# Patient Record
Sex: MALE | Race: WHITE | NOT HISPANIC OR LATINO | Employment: OTHER | URBAN - METROPOLITAN AREA
[De-identification: names, ages, dates, MRNs, and addresses within clinical notes are randomized per-mention and may not be internally consistent; named-entity substitution may affect disease eponyms.]

---

## 2017-08-05 ENCOUNTER — APPOINTMENT (EMERGENCY)
Dept: RADIOLOGY | Facility: HOSPITAL | Age: 37
End: 2017-08-05

## 2017-08-05 ENCOUNTER — HOSPITAL ENCOUNTER (EMERGENCY)
Facility: HOSPITAL | Age: 37
Discharge: HOME | End: 2017-08-05
Attending: EMERGENCY MEDICINE | Admitting: EMERGENCY MEDICINE

## 2017-08-05 VITALS
TEMPERATURE: 97.4 F | RESPIRATION RATE: 18 BRPM | SYSTOLIC BLOOD PRESSURE: 124 MMHG | DIASTOLIC BLOOD PRESSURE: 84 MMHG | OXYGEN SATURATION: 98 % | HEART RATE: 54 BPM | WEIGHT: 209.88 LBS

## 2017-08-05 DIAGNOSIS — R07.9 CHEST PAIN, UNSPECIFIED TYPE: Primary | ICD-10-CM

## 2017-08-05 LAB
ANION GAP SERPL CALCULATED.3IONS-SCNC: 9 MMOL/L (ref 4–13)
BASOPHILS # BLD AUTO: 0.01 THOUSANDS/ΜL (ref 0–0.1)
BASOPHILS NFR BLD AUTO: 0 % (ref 0–1)
BUN SERPL-MCNC: 15 MG/DL (ref 5–25)
CALCIUM SERPL-MCNC: 8.8 MG/DL (ref 8.3–10.1)
CHLORIDE SERPL-SCNC: 99 MMOL/L (ref 100–108)
CO2 SERPL-SCNC: 29 MMOL/L (ref 21–32)
CREAT SERPL-MCNC: 0.85 MG/DL (ref 0.6–1.3)
EOSINOPHIL # BLD AUTO: 0.16 THOUSAND/ΜL (ref 0–0.61)
EOSINOPHIL NFR BLD AUTO: 3 % (ref 0–6)
ERYTHROCYTE [DISTWIDTH] IN BLOOD BY AUTOMATED COUNT: 12.6 % (ref 11.6–15.1)
GFR SERPL CREATININE-BSD FRML MDRD: 113 ML/MIN/1.73SQ M
GLUCOSE SERPL-MCNC: 103 MG/DL (ref 65–140)
HCT VFR BLD AUTO: 41.8 % (ref 36.5–49.3)
HGB BLD-MCNC: 14.4 G/DL (ref 12–17)
LIPASE SERPL-CCNC: 118 U/L (ref 73–393)
LYMPHOCYTES # BLD AUTO: 1.28 THOUSANDS/ΜL (ref 0.6–4.47)
LYMPHOCYTES NFR BLD AUTO: 25 % (ref 14–44)
MCH RBC QN AUTO: 30.8 PG (ref 26.8–34.3)
MCHC RBC AUTO-ENTMCNC: 34.4 G/DL (ref 31.4–37.4)
MCV RBC AUTO: 89 FL (ref 82–98)
MONOCYTES # BLD AUTO: 0.52 THOUSAND/ΜL (ref 0.17–1.22)
MONOCYTES NFR BLD AUTO: 10 % (ref 4–12)
NEUTROPHILS # BLD AUTO: 3.24 THOUSANDS/ΜL (ref 1.85–7.62)
NEUTS SEG NFR BLD AUTO: 62 % (ref 43–75)
PLATELET # BLD AUTO: 296 THOUSANDS/UL (ref 149–390)
PMV BLD AUTO: 8.8 FL (ref 8.9–12.7)
POTASSIUM SERPL-SCNC: 3.7 MMOL/L (ref 3.5–5.3)
RBC # BLD AUTO: 4.68 MILLION/UL (ref 3.88–5.62)
SODIUM SERPL-SCNC: 137 MMOL/L (ref 136–145)
TROPONIN I SERPL-MCNC: <0.02 NG/ML
WBC # BLD AUTO: 5.21 THOUSAND/UL (ref 4.31–10.16)

## 2017-08-05 PROCEDURE — 83690 ASSAY OF LIPASE: CPT | Performed by: PHYSICIAN ASSISTANT

## 2017-08-05 PROCEDURE — 84484 ASSAY OF TROPONIN QUANT: CPT | Performed by: PHYSICIAN ASSISTANT

## 2017-08-05 PROCEDURE — 71020 HB CHEST X-RAY 2VW FRONTAL&LATL: CPT

## 2017-08-05 PROCEDURE — 96360 HYDRATION IV INFUSION INIT: CPT

## 2017-08-05 PROCEDURE — 80048 BASIC METABOLIC PNL TOTAL CA: CPT | Performed by: PHYSICIAN ASSISTANT

## 2017-08-05 PROCEDURE — 36415 COLL VENOUS BLD VENIPUNCTURE: CPT | Performed by: PHYSICIAN ASSISTANT

## 2017-08-05 PROCEDURE — 93005 ELECTROCARDIOGRAM TRACING: CPT

## 2017-08-05 PROCEDURE — 93005 ELECTROCARDIOGRAM TRACING: CPT | Performed by: PHYSICIAN ASSISTANT

## 2017-08-05 PROCEDURE — 85025 COMPLETE CBC W/AUTO DIFF WBC: CPT | Performed by: PHYSICIAN ASSISTANT

## 2017-08-05 PROCEDURE — 99285 EMERGENCY DEPT VISIT HI MDM: CPT

## 2017-08-05 RX ADMIN — SODIUM CHLORIDE 500 ML: 0.9 INJECTION, SOLUTION INTRAVENOUS at 09:16

## 2017-08-07 LAB
ATRIAL RATE: 60 BPM
P AXIS: 37 DEGREES
PR INTERVAL: 172 MS
QRS AXIS: 44 DEGREES
QRSD INTERVAL: 102 MS
QT INTERVAL: 408 MS
QTC INTERVAL: 408 MS
T WAVE AXIS: 0 DEGREES
VENTRICULAR RATE: 60 BPM

## 2022-04-07 ENCOUNTER — APPOINTMENT (EMERGENCY)
Dept: CT IMAGING | Facility: HOSPITAL | Age: 42
End: 2022-04-07
Payer: COMMERCIAL

## 2022-04-07 ENCOUNTER — HOSPITAL ENCOUNTER (EMERGENCY)
Facility: HOSPITAL | Age: 42
Discharge: HOME | End: 2022-04-07
Attending: EMERGENCY MEDICINE | Admitting: EMERGENCY MEDICINE
Payer: COMMERCIAL

## 2022-04-07 VITALS
OXYGEN SATURATION: 95 % | TEMPERATURE: 98.6 F | RESPIRATION RATE: 18 BRPM | DIASTOLIC BLOOD PRESSURE: 82 MMHG | SYSTOLIC BLOOD PRESSURE: 129 MMHG | HEART RATE: 80 BPM

## 2022-04-07 DIAGNOSIS — K52.9 ENTERITIS: Primary | ICD-10-CM

## 2022-04-07 LAB
ALBUMIN SERPL BCP-MCNC: 4.6 G/DL (ref 3.5–5)
ALP SERPL-CCNC: 60 U/L (ref 46–116)
ALT SERPL W P-5'-P-CCNC: 44 U/L (ref 12–78)
ANION GAP SERPL CALCULATED.3IONS-SCNC: 11 MMOL/L (ref 4–13)
AST SERPL W P-5'-P-CCNC: 52 U/L (ref 5–45)
BASOPHILS # BLD AUTO: 0.02 THOUSANDS/ΜL (ref 0–0.1)
BASOPHILS NFR BLD AUTO: 0 % (ref 0–1)
BILIRUB SERPL-MCNC: 0.64 MG/DL (ref 0.2–1)
BUN SERPL-MCNC: 16 MG/DL (ref 5–25)
CALCIUM SERPL-MCNC: 9.1 MG/DL (ref 8.3–10.1)
CHLORIDE SERPL-SCNC: 99 MMOL/L (ref 100–108)
CO2 SERPL-SCNC: 25 MMOL/L (ref 21–32)
CREAT SERPL-MCNC: 0.88 MG/DL (ref 0.6–1.3)
EOSINOPHIL # BLD AUTO: 0.09 THOUSAND/ΜL (ref 0–0.61)
EOSINOPHIL NFR BLD AUTO: 1 % (ref 0–6)
ERYTHROCYTE [DISTWIDTH] IN BLOOD BY AUTOMATED COUNT: 12.4 % (ref 11.6–15.1)
GFR SERPL CREATININE-BSD FRML MDRD: 108 ML/MIN/1.73SQ M
GLUCOSE SERPL-MCNC: 88 MG/DL (ref 65–140)
HCT VFR BLD AUTO: 47.1 % (ref 36.5–49.3)
HGB BLD-MCNC: 15.7 G/DL (ref 12–17)
IMM GRANULOCYTES # BLD AUTO: 0.03 THOUSAND/UL (ref 0–0.2)
IMM GRANULOCYTES NFR BLD AUTO: 0 % (ref 0–2)
LIPASE SERPL-CCNC: 98 U/L (ref 73–393)
LYMPHOCYTES # BLD AUTO: 1.9 THOUSANDS/ΜL (ref 0.6–4.47)
LYMPHOCYTES NFR BLD AUTO: 26 % (ref 14–44)
MCH RBC QN AUTO: 31.5 PG (ref 26.8–34.3)
MCHC RBC AUTO-ENTMCNC: 33.3 G/DL (ref 31.4–37.4)
MCV RBC AUTO: 95 FL (ref 82–98)
MONOCYTES # BLD AUTO: 0.96 THOUSAND/ΜL (ref 0.17–1.22)
MONOCYTES NFR BLD AUTO: 13 % (ref 4–12)
NEUTROPHILS # BLD AUTO: 4.46 THOUSANDS/ΜL (ref 1.85–7.62)
NEUTS SEG NFR BLD AUTO: 60 % (ref 43–75)
NRBC BLD AUTO-RTO: 0 /100 WBCS
PLATELET # BLD AUTO: 340 THOUSANDS/UL (ref 149–390)
PMV BLD AUTO: 8.8 FL (ref 8.9–12.7)
POTASSIUM SERPL-SCNC: 4.2 MMOL/L (ref 3.5–5.3)
PROT SERPL-MCNC: 8.9 G/DL (ref 6.4–8.2)
RBC # BLD AUTO: 4.98 MILLION/UL (ref 3.88–5.62)
SODIUM SERPL-SCNC: 135 MMOL/L (ref 136–145)
WBC # BLD AUTO: 7.46 THOUSAND/UL (ref 4.31–10.16)

## 2022-04-07 PROCEDURE — 83690 ASSAY OF LIPASE: CPT | Performed by: EMERGENCY MEDICINE

## 2022-04-07 PROCEDURE — 80053 COMPREHEN METABOLIC PANEL: CPT | Performed by: FAMILY MEDICINE

## 2022-04-07 PROCEDURE — 74177 CT ABD & PELVIS W/CONTRAST: CPT

## 2022-04-07 PROCEDURE — 99284 EMERGENCY DEPT VISIT MOD MDM: CPT

## 2022-04-07 PROCEDURE — 93005 ELECTROCARDIOGRAM TRACING: CPT

## 2022-04-07 PROCEDURE — 96365 THER/PROPH/DIAG IV INF INIT: CPT

## 2022-04-07 PROCEDURE — 99284 EMERGENCY DEPT VISIT MOD MDM: CPT | Performed by: EMERGENCY MEDICINE

## 2022-04-07 PROCEDURE — 36415 COLL VENOUS BLD VENIPUNCTURE: CPT | Performed by: FAMILY MEDICINE

## 2022-04-07 PROCEDURE — 85025 COMPLETE CBC W/AUTO DIFF WBC: CPT | Performed by: FAMILY MEDICINE

## 2022-04-07 RX ORDER — DICYCLOMINE HCL 20 MG
20 TABLET ORAL 2 TIMES DAILY
Qty: 20 TABLET | Refills: 0 | Status: SHIPPED | OUTPATIENT
Start: 2022-04-07

## 2022-04-07 RX ADMIN — IOHEXOL 100 ML: 350 INJECTION, SOLUTION INTRAVENOUS at 12:55

## 2022-04-07 RX ADMIN — SODIUM CHLORIDE, SODIUM LACTATE, POTASSIUM CHLORIDE, AND CALCIUM CHLORIDE 500 ML: .6; .31; .03; .02 INJECTION, SOLUTION INTRAVENOUS at 13:02

## 2022-04-07 NOTE — ED PROVIDER NOTES
History  Chief Complaint   Patient presents with    Abdominal Pain     Abdominal pain x 1 week  + nausea, + diarrhea  Generalized abdominal pain accompanied by nausea and diarrhea for the past week  Symptoms do come and go historically  Reports up to 10 bowel movements per day  Patient since the pain was 0/10 but began to localize to the right side on exam   Does endorse some blood in stool but is attributing this to a hemorrhoid  At home bowel movements will fluctuate between constipation and diarrhea  Pain is typically relieved with bowel movements  Patient also endorses having acid reflux  Does not take any medications right now  No history of surgeries  None       History reviewed  No pertinent past medical history  History reviewed  No pertinent surgical history  History reviewed  No pertinent family history  I have reviewed and agree with the history as documented  E-Cigarette/Vaping     E-Cigarette/Vaping Substances     Social History     Tobacco Use    Smoking status: Current Some Day Smoker     Types: Cigarettes    Smokeless tobacco: Never Used   Substance Use Topics    Alcohol use: Yes    Drug use: No        Review of Systems   Constitutional: Negative for chills, fatigue and fever  Respiratory: Negative for cough and shortness of breath  Cardiovascular: Positive for chest pain  Gastrointestinal: Positive for abdominal pain, blood in stool, diarrhea and nausea  Negative for anal bleeding, constipation, rectal pain and vomiting  Genitourinary: Negative for dysuria, enuresis, flank pain, frequency, hematuria and urgency  Musculoskeletal: Positive for back pain  Negative for gait problem  Neurological: Negative for light-headedness and headaches  Psychiatric/Behavioral: The patient is not nervous/anxious  All other systems reviewed and are negative        Physical Exam  ED Triage Vitals [04/07/22 1121]   Temperature Pulse Respirations Blood Pressure SpO2 98 6 °F (37 °C) 92 18 137/86 96 %      Temp Source Heart Rate Source Patient Position - Orthostatic VS BP Location FiO2 (%)   Oral Monitor -- -- --      Pain Score       3             Orthostatic Vital Signs  Vitals:    04/07/22 1121 04/07/22 1315   BP: 137/86 129/82   Pulse: 92 80       Physical Exam  Vitals and nursing note reviewed  Constitutional:       Appearance: Normal appearance  He is well-developed  HENT:      Head: Normocephalic and atraumatic  Eyes:      Conjunctiva/sclera: Conjunctivae normal       Pupils: Pupils are equal, round, and reactive to light  Cardiovascular:      Rate and Rhythm: Normal rate and regular rhythm  Heart sounds: Normal heart sounds  Pulmonary:      Effort: Pulmonary effort is normal       Breath sounds: Normal breath sounds  Abdominal:      General: Bowel sounds are increased  There is no distension or abdominal bruit  Palpations: Abdomen is soft  There is no mass  Tenderness: There is no abdominal tenderness  There is no right CVA tenderness, left CVA tenderness or guarding  Negative signs include Wadsworth's sign and McBurney's sign  Hernia: No hernia is present  Musculoskeletal:         General: Normal range of motion  Cervical back: Normal range of motion  Skin:     General: Skin is warm and dry  Neurological:      General: No focal deficit present  Mental Status: He is alert and oriented to person, place, and time     Psychiatric:         Speech: Speech normal          Behavior: Behavior normal          ED Medications  Medications   lactated ringers bolus 500 mL (0 mL Intravenous Stopped 4/7/22 1326)   iohexol (OMNIPAQUE) 350 MG/ML injection (SINGLE-DOSE) 100 mL (100 mL Intravenous Given 4/7/22 1255)       Diagnostic Studies  Results Reviewed     Procedure Component Value Units Date/Time    Lipase [[id number]]  (Normal) Collected: 04/07/22 1156    Lab Status: Final result Specimen: Blood from Arm, Right Updated: 04/07/22 6809 Lipase 98 u/L     Comprehensive metabolic panel [[id number]]  (Abnormal) Collected: 04/07/22 1156    Lab Status: Final result Specimen: Blood from Arm, Right Updated: 04/07/22 1227     Sodium 135 mmol/L      Potassium 4 2 mmol/L      Chloride 99 mmol/L      CO2 25 mmol/L      ANION GAP 11 mmol/L      BUN 16 mg/dL      Creatinine 0 88 mg/dL      Glucose 88 mg/dL      Calcium 9 1 mg/dL      AST 52 U/L      ALT 44 U/L      Alkaline Phosphatase 60 U/L      Total Protein 8 9 g/dL      Albumin 4 6 g/dL      Total Bilirubin 0 64 mg/dL      eGFR 108 ml/min/1 73sq m     Narrative:      [place] guidelines for Chronic Kidney Disease (CKD):     Stage 1 with normal or high GFR (GFR > 90 mL/min/1 73 square meters)    Stage 2 Mild CKD (GFR = 60-89 mL/min/1 73 square meters)    Stage 3A Moderate CKD (GFR = 45-59 mL/min/1 73 square meters)    Stage 3B Moderate CKD (GFR = 30-44 mL/min/1 73 square meters)    Stage 4 Severe CKD (GFR = 15-29 mL/min/1 73 square meters)    Stage 5 End Stage CKD (GFR <15 mL/min/1 73 square meters)  Note: GFR calculation is accurate only with a steady state creatinine    CBC and differential [[id number]]  (Abnormal) Collected: 04/07/22 1156    Lab Status: Final result Specimen: Blood from Arm, Right Updated: 04/07/22 1209     WBC 7 46 Thousand/uL      RBC 4 98 Million/uL      Hemoglobin 15 7 g/dL      Hematocrit 47 1 %      MCV 95 fL      MCH 31 5 pg      MCHC 33 3 g/dL      RDW 12 4 %      MPV 8 8 fL      Platelets 443 Thousands/uL      nRBC 0 /100 WBCs      Neutrophils Relative 60 %      Immat GRANS % 0 %      Lymphocytes Relative 26 %      Monocytes Relative 13 %      Eosinophils Relative 1 %      Basophils Relative 0 %      Neutrophils Absolute 4 46 Thousands/µL      Immature Grans Absolute 0 03 Thousand/uL      Lymphocytes Absolute 1 90 Thousands/µL      Monocytes Absolute 0 96 Thousand/µL      Eosinophils Absolute 0 09 Thousand/µL      Basophils Absolute 0 02 Thousands/µL CT abdomen pelvis with contrast   Final Result by Amol Nicholson MD (04/07 1316)   Diverticular disease noted without evidence of acute diverticulitis  No findings to account for left lower quadrant pain  No acute findings  Workstation performed: [id number]               Procedures  Procedures      ED Course                                       MDM  Number of Diagnoses or Management Options  Enteritis  Diagnosis management comments: Differential included IBS-D, enteritis, diverticulitis  CBC and lipase unremarkable  CMP showed slight elevation of AST  CT of the abdomen did show some diverticulosis but no diverticulitis  Patient is currently hemodynamically stable and doing well  Will send bentyl to pharmacy  Follow-up with gastroenterology as an outpatient  Amount and/or Complexity of Data Reviewed  Clinical lab tests: ordered  Tests in the radiology section of CPT®: ordered        Disposition  Final diagnoses:   Enteritis     Time reflects when diagnosis was documented in both MDM as applicable and the Disposition within this note     Time User Action Codes Description Comment    4/7/2022  1:19 PM Idelia Printers Add [R10 84] Generalized abdominal pain     4/7/2022  1:21 PM Idelia Printers Remove [R10 84] Generalized abdominal pain     4/7/2022  1:21 PM Idelia Printers Add [K52 9] Enteritis       ED Disposition     ED Disposition Condition Date/Time Comment    Discharge Stable Thu Apr 7, 2022  1:21 PM Reanna Aguilar discharge to home/self care              Follow-up Information     Follow up With Specialties Details Why Contact Info Additional [facility] Schedule an appointment as soon as possible for a visit   [address] [facility] [place], [facility] [id number] [place] [address], [phone]   [phone]          Patient's Medications   Discharge Prescriptions    DICYCLOMINE (BENTYL) 20 MG TABLET    Take 1 tablet (20 mg total) by mouth 2 (two) times a day       Start Date: 4/7/2022  End Date: --       Order Dose: 20 mg       Quantity: 20 tablet    Refills: 0     No discharge procedures on file  PDMP Review     None           ED Provider  Attending physically available and evaluated [facility]  I managed the patient along with the ED Attending      Electronically Signed by         Kimberly Sun MD  04/07/22 [address] Dee Flores MD  04/07/22 3330

## 2022-04-08 LAB
ATRIAL RATE: 96 BPM
P AXIS: 36 DEGREES
PR INTERVAL: 172 MS
QRS AXIS: 37 DEGREES
QRSD INTERVAL: 98 MS
QT INTERVAL: 340 MS
QTC INTERVAL: 429 MS
T WAVE AXIS: 9 DEGREES
VENTRICULAR RATE: 96 BPM

## 2022-04-08 PROCEDURE — 93010 ELECTROCARDIOGRAM REPORT: CPT | Performed by: INTERNAL MEDICINE

## 2022-04-16 NOTE — ED ATTENDING ATTESTATION
4/7/2022  IMiranda MD, saw and evaluated the patient  I have discussed the patient with the resident/non-physician practitioner and agree with the resident's/non-physician practitioner's findings, Plan of Care, and MDM as documented in the resident's/non-physician practitioner's note, except where noted  All available labs and Radiology studies were reviewed  I was present for key portions of any procedure(s) performed by the resident/non-physician practitioner and I was immediately available to provide assistance  At this point I agree with the current assessment done in the Emergency Department    I have conducted an independent evaluation of this patient a history and physical is as follows:see h and p above     ED Course         Critical Care Time  Procedures

## 2024-07-31 ENCOUNTER — APPOINTMENT (EMERGENCY)
Dept: CT IMAGING | Facility: HOSPITAL | Age: 44
End: 2024-07-31

## 2024-07-31 ENCOUNTER — APPOINTMENT (EMERGENCY)
Dept: RADIOLOGY | Facility: HOSPITAL | Age: 44
End: 2024-07-31

## 2024-07-31 ENCOUNTER — HOSPITAL ENCOUNTER (EMERGENCY)
Facility: HOSPITAL | Age: 44
Discharge: HOME | End: 2024-07-31
Attending: EMERGENCY MEDICINE

## 2024-07-31 VITALS
HEIGHT: 70 IN | SYSTOLIC BLOOD PRESSURE: 137 MMHG | HEART RATE: 88 BPM | TEMPERATURE: 98.4 F | OXYGEN SATURATION: 97 % | DIASTOLIC BLOOD PRESSURE: 88 MMHG | WEIGHT: 199.52 LBS | BODY MASS INDEX: 28.56 KG/M2 | RESPIRATION RATE: 16 BRPM

## 2024-07-31 DIAGNOSIS — S20.211A RIB CONTUSION, RIGHT, INITIAL ENCOUNTER: ICD-10-CM

## 2024-07-31 DIAGNOSIS — K04.7 DENTAL INFECTION: ICD-10-CM

## 2024-07-31 DIAGNOSIS — S09.90XA CLOSED HEAD INJURY, INITIAL ENCOUNTER: ICD-10-CM

## 2024-07-31 DIAGNOSIS — S06.0X9A CONCUSSION WITH LOSS OF CONSCIOUSNESS, INITIAL ENCOUNTER: Primary | ICD-10-CM

## 2024-07-31 PROCEDURE — 99284 EMERGENCY DEPT VISIT MOD MDM: CPT

## 2024-07-31 PROCEDURE — 70450 CT HEAD/BRAIN W/O DYE: CPT

## 2024-07-31 PROCEDURE — 71101 X-RAY EXAM UNILAT RIBS/CHEST: CPT

## 2024-07-31 PROCEDURE — 99284 EMERGENCY DEPT VISIT MOD MDM: CPT | Performed by: EMERGENCY MEDICINE

## 2024-07-31 RX ORDER — MECLIZINE HYDROCHLORIDE 25 MG/1
25 TABLET ORAL 3 TIMES DAILY PRN
Qty: 30 TABLET | Refills: 0 | Status: SHIPPED | OUTPATIENT
Start: 2024-07-31

## 2024-07-31 RX ORDER — PENICILLIN V POTASSIUM 250 MG/1
500 TABLET ORAL EVERY 6 HOURS SCHEDULED
Status: DISCONTINUED | OUTPATIENT
Start: 2024-07-31 | End: 2024-07-31 | Stop reason: HOSPADM

## 2024-07-31 RX ORDER — PENICILLIN V POTASSIUM 500 MG/1
500 TABLET ORAL 4 TIMES DAILY
Qty: 28 TABLET | Refills: 0 | Status: SHIPPED | OUTPATIENT
Start: 2024-07-31 | End: 2024-08-07

## 2024-07-31 RX ORDER — MECLIZINE HYDROCHLORIDE 25 MG/1
25 TABLET ORAL ONCE
Status: COMPLETED | OUTPATIENT
Start: 2024-07-31 | End: 2024-07-31

## 2024-07-31 RX ORDER — HYDROXYZINE HYDROCHLORIDE 25 MG/1
25 TABLET, FILM COATED ORAL EVERY 6 HOURS
Qty: 12 TABLET | Refills: 0 | Status: SHIPPED | OUTPATIENT
Start: 2024-07-31

## 2024-07-31 RX ADMIN — PENICILLIN V POTASSIUM 500 MG: 250 TABLET, FILM COATED ORAL at 13:45

## 2024-07-31 RX ADMIN — MECLIZINE HYDROCHLORIDE 25 MG: 25 TABLET ORAL at 13:40

## 2024-07-31 NOTE — ED PROVIDER NOTES
History  Chief Complaint   Patient presents with    Fall     Reports fall of approximately 7 feet from ladder 2 weeks ago (+) head strike and LOC, intermittent headaches since, R upper rib pain     The patient is a 44 year old male who presents to ED with his wife for evaluation after a fall with head strike. Pt states he was on an 8 foot ladder but not on the highest rung, so he approximates nirmal of 7 feet, when he fell onto concrete with a right sided head strike and LOC of unknown duration but he guesses approximately 15 minutes. He states that since then he has been having right sided headaches, lightheadedness, mental confusion and fogginess. Wife notes the pt has been having her drive him to work due to sx and this is very unusual for him. Pt also reports some right lateral rib pain, worse with deep breathing, hiccups, and coughing. He additionally notes he has a cracked tooth to the right upper jaw that is painful and he is concerned for infection. He has not seen a dentist about it yet. Denies SOB, fever, neck pain, back pain, abdominal pain, weakness, paresthesia        Prior to Admission Medications   Prescriptions Last Dose Informant Patient Reported? Taking?   dicyclomine (BENTYL) 20 mg tablet   No No   Sig: Take 1 tablet (20 mg total) by mouth 2 (two) times a day      Facility-Administered Medications: None       Past Medical History:   Diagnosis Date    Tremor        History reviewed. No pertinent surgical history.    History reviewed. No pertinent family history.  I have reviewed and agree with the history as documented.    E-Cigarette/Vaping    E-Cigarette Use Never User      E-Cigarette/Vaping Substances     Social History     Tobacco Use    Smoking status: Every Day     Current packs/day: 0.50     Types: Cigarettes    Smokeless tobacco: Never   Vaping Use    Vaping status: Never Used   Substance Use Topics    Alcohol use: Yes     Comment: daily    Drug use: No        Review of Systems    Constitutional:  Positive for activity change. Negative for chills and fever.   HENT:  Positive for dental problem. Negative for ear pain, sore throat and trouble swallowing.    Eyes:  Negative for visual disturbance.   Respiratory:  Negative for cough and shortness of breath.    Gastrointestinal:  Negative for abdominal pain and vomiting.   Genitourinary:  Negative for difficulty urinating and dysuria.   Musculoskeletal:  Negative for back pain, gait problem, neck pain and neck stiffness.   Skin:  Negative for rash and wound.   Neurological:  Positive for dizziness, light-headedness and headaches. Negative for speech difficulty, weakness and numbness.   Psychiatric/Behavioral:  Positive for confusion and decreased concentration.        Physical Exam  ED Triage Vitals [07/31/24 1307]   Temperature Pulse Respirations Blood Pressure SpO2   98.3 °F (36.8 °C) 84 18 142/94 97 %      Temp Source Heart Rate Source Patient Position - Orthostatic VS BP Location FiO2 (%)   Oral Monitor Lying Right arm --      Pain Score       5             Orthostatic Vital Signs  Vitals:    07/31/24 1307 07/31/24 1439   BP: 142/94 137/88   Pulse: 84 88   Patient Position - Orthostatic VS: Lying Sitting       Physical Exam  Vitals and nursing note reviewed.   Constitutional:       Appearance: Normal appearance.   HENT:      Head: Normocephalic. Contusion present. No raccoon eyes, Rosado's sign or laceration.        Comments: Tenderness tot he right parietal scalp     Nose: Nose normal.      Mouth/Throat:      Lips: Pink.      Mouth: Mucous membranes are moist.      Dentition: Abnormal dentition. Dental tenderness present. No gingival swelling or dental abscesses.      Tongue: No lesions. Tongue does not deviate from midline.      Pharynx: Oropharynx is clear. Uvula midline. No oropharyngeal exudate or posterior oropharyngeal erythema.      Tonsils: No tonsillar exudate.        Comments: Cracked tooth #3 with tenderness to palpation. No  palpable abscess or fluctuance   Eyes:      General:         Right eye: No discharge.         Left eye: No discharge.      Extraocular Movements: Extraocular movements intact.      Conjunctiva/sclera: Conjunctivae normal.      Pupils: Pupils are equal, round, and reactive to light.   Cardiovascular:      Rate and Rhythm: Normal rate and regular rhythm.      Pulses: Normal pulses.      Heart sounds: Normal heart sounds.   Pulmonary:      Effort: Pulmonary effort is normal.      Breath sounds: Normal breath sounds.   Chest:      Chest wall: Tenderness present.   Abdominal:      General: Abdomen is flat. Bowel sounds are normal.      Palpations: Abdomen is soft.      Tenderness: There is no abdominal tenderness. There is no right CVA tenderness, left CVA tenderness or rebound.   Musculoskeletal:         General: No swelling, tenderness, deformity or signs of injury. Normal range of motion.      Cervical back: Normal range of motion and neck supple. No rigidity or tenderness.      Right lower leg: No edema.      Left lower leg: No edema.   Lymphadenopathy:      Cervical: No cervical adenopathy.   Skin:     General: Skin is warm and dry.      Findings: No bruising or erythema.   Neurological:      General: No focal deficit present.      Mental Status: He is alert and oriented to person, place, and time.      Cranial Nerves: No cranial nerve deficit or facial asymmetry.      Sensory: Sensation is intact. No sensory deficit.      Motor: No weakness.   Psychiatric:         Mood and Affect: Mood normal.         Behavior: Behavior normal.         ED Medications  Medications   meclizine (ANTIVERT) tablet 25 mg (25 mg Oral Given 7/31/24 1340)       Diagnostic Studies  Results Reviewed       None                   XR ribs right w pa chest min 3 views   ED Interpretation by Kala Lowe MD (07/31 6466)   NO acute fracture      Final Result by Mik Linares MD (07/31 1525)      No evidence of a rib fracture.      No  acute cardiopulmonary disease.            Resident: Pita Ellis I, the attending radiologist, have reviewed the images and agree with the final report above.      Workstation performed: [id number]         CT head without contrast   Final Result by Nabeel Blake MD (07/31 1434)      No acute intracranial abnormality.                  Workstation performed: [id number]               Procedures  Procedures      ED Course  ED Course as of 07/31/24 1857   Wed Jul 31, 2024   1448 CT head without contrast  IMPRESSION:  No acute intracranial abnormality.   1503 Recheck of pt - he feels a bit better after the meclizine. Discussed results of Head CT and rib xray. Will send referral to concussion clinic. Wife states the pt has been more anxious recently. Will send RX for atarax and meclizine.                                       Medical Decision Making  Ddx includes but not limited to: concussion, intracranial bleed, fracture, contusion, dental infection    Pt reports headaches, mental slowness, difficulty with concentration s/p fall with head strike and loss of consciousness.   CT head negative  Xray right ribs without fracture  Pt given meclizine and pt reports improvement of sx. RX sent for meclizine.  Pt notes increased anxiety and RX sent for atarax  Referral sent for concussion clinic and pt encouraged to follow up with concussion service.   Pt noting broken right upper molar with pain. Broken tooth noted on exam. Pt given RX for penicillin and encourage to follow up with his dentist.     Amount and/or Complexity of Data Reviewed  Independent Historian: spouse  Radiology: ordered and independent interpretation performed. Decision-making details documented in ED Course.    Risk  Prescription drug management.          Disposition  Final diagnoses:   Closed head injury, initial encounter   Concussion with loss of consciousness, initial encounter   Dental infection   Rib contusion, right, initial encounter     Time  reflects when diagnosis was documented in both MDM as applicable and the Disposition within this note       Time User Action Codes Description Comment    7/31/2024  2:53 PM Kala Lowe Add [S09.90XA] Closed head injury, initial encounter     7/31/2024  2:53 PM Kala Lowe Add [S06.0X9A] Concussion with loss of consciousness, initial encounter     7/31/2024  2:54 PM Kala Lowe Modify [S09.90XA] Closed head injury, initial encounter     7/31/2024  2:54 PM Kala Lowe Modify [S06.0X9A] Concussion with loss of consciousness, initial encounter     7/31/2024  2:54 PM Kala Lowe Add [K04.7] Dental infection     7/31/2024  2:55 PM Kala Lowe Add [S20.211A] Rib contusion, right, initial encounter           ED Disposition       ED Disposition   Discharge    Condition   Stable    Date/Time   Wed Jul 31, 2024  3:06 PM    Comment   Balaji Hamlin discharge to home/self care.                   Follow-up Information    None         Discharge Medication List as of 7/31/2024  3:15 PM        START taking these medications    Details   hydrOXYzine HCL (ATARAX) 25 mg tablet Take 1 tablet (25 mg total) by mouth every 6 (six) hours, Starting Wed 7/31/2024, Normal      meclizine (ANTIVERT) 25 mg tablet Take 1 tablet (25 mg total) by mouth 3 (three) times a day as needed for dizziness, Starting Wed 7/31/2024, Normal      penicillin V potassium (VEETID) 500 mg tablet Take 1 tablet (500 mg total) by mouth 4 (four) times a day for 7 days, Starting Wed 7/31/2024, Until Wed 8/7/2024, Normal           CONTINUE these medications which have NOT CHANGED    Details   dicyclomine (BENTYL) 20 mg tablet Take 1 tablet (20 mg total) by mouth 2 (two) times a day, Starting Thu 4/7/2022, Normal               PDMP Review       None             ED Provider  Attending physically available and evaluated Balaji Hamlin. I managed the patient along with the ED Attending.    Electronically Signed by           Kala  Almita Lowe MD  07/31/24 0100

## 2025-06-11 ENCOUNTER — APPOINTMENT (EMERGENCY)
Dept: RADIOLOGY | Facility: HOSPITAL | Age: 45
DRG: 897 | End: 2025-06-11

## 2025-06-11 ENCOUNTER — APPOINTMENT (EMERGENCY)
Dept: CT IMAGING | Facility: HOSPITAL | Age: 45
DRG: 897 | End: 2025-06-11

## 2025-06-11 ENCOUNTER — HOSPITAL ENCOUNTER (INPATIENT)
Facility: HOSPITAL | Age: 45
LOS: 3 days | Discharge: HOME | DRG: 897 | End: 2025-06-15
Attending: EMERGENCY MEDICINE | Admitting: INTERNAL MEDICINE

## 2025-06-11 DIAGNOSIS — F10.929 ACUTE ALCOHOL INTOXICATION (HCC): Primary | ICD-10-CM

## 2025-06-11 DIAGNOSIS — F10.10 ALCOHOL ABUSE: ICD-10-CM

## 2025-06-11 DIAGNOSIS — F10.20 ALCOHOL USE DISORDER, SEVERE, DEPENDENCE (HCC): ICD-10-CM

## 2025-06-11 DIAGNOSIS — Z72.0 TOBACCO ABUSE: ICD-10-CM

## 2025-06-11 DIAGNOSIS — I42.6 ALCOHOLIC CARDIOMYOPATHY (HCC): ICD-10-CM

## 2025-06-11 DIAGNOSIS — E87.29 ALCOHOLIC KETOACIDOSIS: ICD-10-CM

## 2025-06-11 DIAGNOSIS — F41.9 ANXIETY: ICD-10-CM

## 2025-06-11 LAB
ALBUMIN SERPL BCG-MCNC: 5 G/DL (ref 3.5–5)
ALP SERPL-CCNC: 50 U/L (ref 34–104)
ALT SERPL W P-5'-P-CCNC: 92 U/L (ref 7–52)
AMMONIA PLAS-SCNC: 38 UMOL/L (ref 18–72)
AMPHETAMINES SERPL QL SCN: NEGATIVE
ANION GAP SERPL CALCULATED.3IONS-SCNC: 16 MMOL/L (ref 4–13)
APAP SERPL-MCNC: <2 UG/ML (ref 10–20)
APTT PPP: 27 SECONDS (ref 23–34)
AST SERPL W P-5'-P-CCNC: 194 U/L (ref 13–39)
BARBITURATES UR QL: NEGATIVE
BASOPHILS # BLD AUTO: 0.03 THOUSANDS/ÂΜL (ref 0–0.1)
BASOPHILS NFR BLD AUTO: 1 % (ref 0–1)
BENZODIAZ UR QL: NEGATIVE
BILIRUB SERPL-MCNC: 0.75 MG/DL (ref 0.2–1)
BUN SERPL-MCNC: 8 MG/DL (ref 5–25)
CALCIUM SERPL-MCNC: 9 MG/DL (ref 8.4–10.2)
CHLORIDE SERPL-SCNC: 99 MMOL/L (ref 96–108)
CO2 SERPL-SCNC: 27 MMOL/L (ref 21–32)
COCAINE UR QL: NEGATIVE
CREAT SERPL-MCNC: 0.73 MG/DL (ref 0.6–1.3)
EOSINOPHIL # BLD AUTO: 0.11 THOUSAND/ÂΜL (ref 0–0.61)
EOSINOPHIL NFR BLD AUTO: 3 % (ref 0–6)
ERYTHROCYTE [DISTWIDTH] IN BLOOD BY AUTOMATED COUNT: 13.3 % (ref 11.6–15.1)
ETHANOL EXG-MCNC: 0.34 MG/DL
ETHANOL SERPL-MCNC: 535 MG/DL
FENTANYL UR QL SCN: NEGATIVE
FOLATE SERPL-MCNC: 7.2 NG/ML
GFR SERPL CREATININE-BSD FRML MDRD: 114 ML/MIN/1.73SQ M
GLUCOSE SERPL-MCNC: 120 MG/DL (ref 65–140)
GLUCOSE SERPL-MCNC: 98 MG/DL (ref 65–140)
HCT VFR BLD AUTO: 43.2 % (ref 36.5–49.3)
HGB BLD-MCNC: 14.7 G/DL (ref 12–17)
HYDROCODONE UR QL SCN: NEGATIVE
IMM GRANULOCYTES # BLD AUTO: 0.01 THOUSAND/UL (ref 0–0.2)
IMM GRANULOCYTES NFR BLD AUTO: 0 % (ref 0–2)
INR PPP: 0.89 (ref 0.85–1.19)
LIPASE SERPL-CCNC: 52 U/L (ref 11–82)
LYMPHOCYTES # BLD AUTO: 1.53 THOUSANDS/ÂΜL (ref 0.6–4.47)
LYMPHOCYTES NFR BLD AUTO: 36 % (ref 14–44)
MAGNESIUM SERPL-MCNC: 1.8 MG/DL (ref 1.9–2.7)
MCH RBC QN AUTO: 33 PG (ref 26.8–34.3)
MCHC RBC AUTO-ENTMCNC: 34 G/DL (ref 31.4–37.4)
MCV RBC AUTO: 97 FL (ref 82–98)
METHADONE UR QL: NEGATIVE
MONOCYTES # BLD AUTO: 0.78 THOUSAND/ÂΜL (ref 0.17–1.22)
MONOCYTES NFR BLD AUTO: 19 % (ref 4–12)
NEUTROPHILS # BLD AUTO: 1.75 THOUSANDS/ÂΜL (ref 1.85–7.62)
NEUTS SEG NFR BLD AUTO: 41 % (ref 43–75)
NRBC BLD AUTO-RTO: 0 /100 WBCS
OPIATES UR QL SCN: NEGATIVE
OXYCODONE+OXYMORPHONE UR QL SCN: NEGATIVE
PCP UR QL: NEGATIVE
PHOSPHATE SERPL-MCNC: 4.5 MG/DL (ref 2.7–4.5)
PLATELET # BLD AUTO: 194 THOUSANDS/UL (ref 149–390)
PMV BLD AUTO: 8.7 FL (ref 8.9–12.7)
POTASSIUM SERPL-SCNC: 3.4 MMOL/L (ref 3.5–5.3)
PROT SERPL-MCNC: 7.9 G/DL (ref 6.4–8.4)
PROTHROMBIN TIME: 12.4 SECONDS (ref 12.3–15)
RBC # BLD AUTO: 4.46 MILLION/UL (ref 3.88–5.62)
SALICYLATES SERPL-MCNC: <5 MG/DL (ref 5–20)
SODIUM SERPL-SCNC: 142 MMOL/L (ref 135–147)
THC UR QL: NEGATIVE
VIT B12 SERPL-MCNC: 503 PG/ML (ref 180–914)
WBC # BLD AUTO: 4.21 THOUSAND/UL (ref 4.31–10.16)

## 2025-06-11 PROCEDURE — 85730 THROMBOPLASTIN TIME PARTIAL: CPT | Performed by: EMERGENCY MEDICINE

## 2025-06-11 PROCEDURE — 82077 ASSAY SPEC XCP UR&BREATH IA: CPT | Performed by: EMERGENCY MEDICINE

## 2025-06-11 PROCEDURE — 83690 ASSAY OF LIPASE: CPT | Performed by: EMERGENCY MEDICINE

## 2025-06-11 PROCEDURE — 93005 ELECTROCARDIOGRAM TRACING: CPT

## 2025-06-11 PROCEDURE — 83735 ASSAY OF MAGNESIUM: CPT | Performed by: EMERGENCY MEDICINE

## 2025-06-11 PROCEDURE — 99285 EMERGENCY DEPT VISIT HI MDM: CPT | Performed by: EMERGENCY MEDICINE

## 2025-06-11 PROCEDURE — 70450 CT HEAD/BRAIN W/O DYE: CPT

## 2025-06-11 PROCEDURE — 85610 PROTHROMBIN TIME: CPT | Performed by: EMERGENCY MEDICINE

## 2025-06-11 PROCEDURE — 80053 COMPREHEN METABOLIC PANEL: CPT | Performed by: EMERGENCY MEDICINE

## 2025-06-11 PROCEDURE — 80179 DRUG ASSAY SALICYLATE: CPT | Performed by: EMERGENCY MEDICINE

## 2025-06-11 PROCEDURE — 80307 DRUG TEST PRSMV CHEM ANLYZR: CPT | Performed by: EMERGENCY MEDICINE

## 2025-06-11 PROCEDURE — 82075 ASSAY OF BREATH ETHANOL: CPT | Performed by: EMERGENCY MEDICINE

## 2025-06-11 PROCEDURE — 82140 ASSAY OF AMMONIA: CPT | Performed by: EMERGENCY MEDICINE

## 2025-06-11 PROCEDURE — 82746 ASSAY OF FOLIC ACID SERUM: CPT | Performed by: EMERGENCY MEDICINE

## 2025-06-11 PROCEDURE — 99285 EMERGENCY DEPT VISIT HI MDM: CPT

## 2025-06-11 PROCEDURE — 82948 REAGENT STRIP/BLOOD GLUCOSE: CPT

## 2025-06-11 PROCEDURE — 85025 COMPLETE CBC W/AUTO DIFF WBC: CPT | Performed by: EMERGENCY MEDICINE

## 2025-06-11 PROCEDURE — 71045 X-RAY EXAM CHEST 1 VIEW: CPT

## 2025-06-11 PROCEDURE — 84100 ASSAY OF PHOSPHORUS: CPT | Performed by: EMERGENCY MEDICINE

## 2025-06-11 PROCEDURE — 36415 COLL VENOUS BLD VENIPUNCTURE: CPT | Performed by: EMERGENCY MEDICINE

## 2025-06-11 PROCEDURE — 80143 DRUG ASSAY ACETAMINOPHEN: CPT | Performed by: EMERGENCY MEDICINE

## 2025-06-11 PROCEDURE — 82607 VITAMIN B-12: CPT | Performed by: EMERGENCY MEDICINE

## 2025-06-11 NOTE — ED PROVIDER NOTES
"Time reflects when diagnosis was documented in both MDM as applicable and the Disposition within this note       Time User Action Codes Description Comment    6/11/2025  9:10 PM Simeon Eli [F10.929] Acute alcohol intoxication (HCC)     6/11/2025  9:10 PM Simeon Eli [F10.10] Alcohol abuse           ED Disposition       ED Disposition   Admit    Condition   Stable    Date/Time   Thu Jun 12, 2025 12:44 AM    Comment   Case was discussed with [facility] admitting provider and the patient's admission status was agreed to be Admission Status: inpatient status to the service of [facility].               Assessment & Plan       Medical Decision Making  43 y/o male presents to the ED for evaluation of alcohol abuse, alcohol intoxication, and detox. He is accompanied by his wife, who assists with providing history.  She notes that she became concerned tonight because just prior to arrival the patient had an episode where he appeared to become pale and either passed out briefly or nearly passed out.  The patient states that he has been a functional alcoholic for approximately 22 years and has been able to maintain working despite his daily alcohol intake.  He drinks approximately half a handle of vodka per day, plus additional beer.  He notes that he has been trying to cut back on his alcohol intake on his own over the last 2 weeks due to recently having a friend die from complications related to alcohol and alcohol withdrawal.  He notes that over the last 2 weeks, while trying to wean himself off alcohol, he has been experiencing alcohol withdrawal symptoms of tremors and anxiousness, which prompted him to drink again.  He denies any history of delirium tremens or alcohol withdrawal seizures.  He does not emergency room seeing a near syncopal/syncopal episode tonight, however he does note that he has been dealing with intermittent lightheadedness and dizziness for \"a while\".  His wife reports that he has a " history of a fall from a ladder over a year ago with head injury and concussion at that time.  He denies any recent falls or traumatic injuries since then.  He denies any SI, HI, or hallucinations.  No other physical symptoms or complaints.    Vital signs reviewed.  See physical exam documentation for exam findings.  Differential diagnosis includes but is not limited to alcohol tox occasion, alcohol abuse, alcohol withdrawal, arrhythmia, intracranial abnormality, metabolic disturbance, anemia, and/or electrolyte disturbance.  Will evaluate with labs, ECG, and imaging.  See ED course for independent interpretation of results. The patient is here with his wife and I offered transfer to [place] detox unit, however they declined because they are currently uninsured and are concerned about the additional cost of the transfer.  I believe the patient would be able to stay here for monitoring and treatment of his alcohol withdrawal with plan for toxicology consult during the day.  He also had a near syncopal/syncopal episode tonight, however it was around the time he was actively drinking alcohol this evening and his medical workup including ECG and CT head are overall normal.  Discussed findings and indications for admission with the patient and his family and they are agreeable.  Case discussed with hospitalist for admission.    Amount and/or Complexity of Data Reviewed  Labs: ordered. Decision-making details documented in ED Course.  Radiology: ordered and independent interpretation performed. Decision-making details documented in ED Course.    Risk  Decision regarding hospitalization.        ED Course as of 06/12/25 0057 Wed Jun 11, 2025   1907 POC Glucose: 120   1941 Procedure Note: EKG  Date/Time: 06/11/25 7:39 PM   Interpreted by: Simeon Eli MD  Indications / Diagnosis: Near syncope/Syncope, alcohol abuse  ECG reviewed by me, the ED Physician: yes   The EKG demonstrates:  Rhythm: normal sinus rhythm 85  bpm  Intervals: normal intervals  Axis: normal axis  QRS/Blocks: normal QRS  ST Changes: No STEMI.  No acute ST-T wave abnormality.  No significant change compared to prior ECG from 4/7/2022 2110 CT head without contrast  No acute intracranial abnormality.   2114 XR chest 1 view portable   2114 XR chest 1 view portable  NAD   2159 ETHANOL(!): 535   2159 SALICYLATE LEVEL(!): <5   2159 ACETAMINOPHEN LEVEL(!): <2   2159 Ammonia: 38   2159 Rapid drug screen, urine  All negative   2159 PROTIME: 12.4   2159 POCT INR: 0.89   2159 PTT: 27   2159 Phosphorus: 4.5   2159 LIPASE: 52   2159 MAGNESIUM(!): 1.8   2159 CBC and differential(!)  Slight decreased WBC 4.21, not clinically significant.  Normal hemoglobin, hematocrit, and platelet count.   2200 Comprehensive metabolic panel(!)  Slight decrease potassium 3.4.  Anion gap 16.  Elevated  and ALT 92, most likely in the setting of chronic alcohol abuse.  Remainder of chemistry values are WNL.       Medications - No data to display    ED Risk Strat Scores                 CIWA-Ar Score       Row Name 06/12/25 0030 06/11/25 2330 06/11/25 2230       CIWA-Ar    Blood Pressure -- 120/87 --    Pulse -- 74 --    Nausea and Vomiting 0 0 0    Tactile Disturbances 0 0 0    Tremor 0 0 0    Auditory Disturbances 0 0 0    Paroxysmal Sweats 0 0 0    Visual Disturbances 0 0 0    Anxiety 1 1 2    Headache, Fullness in Head 0 0 3    Agitation 0 0 0    Orientation and Clouding of Sensorium 0 0 0    [facility] Total 1 1 5      Row Name 06/11/25 2130 06/11/25 2030 06/11/25 1930       [facility]-Ar    Blood Pressure -- 125/86 132/91    Pulse -- 86 91    Nausea and Vomiting 0 1 2    Tactile Disturbances 1 1 2  denies itching. mild pins and needles and burning    Tremor 0 0 0  pt reports tremors at home    Auditory Disturbances 0 0 0    Paroxysmal Sweats 0 0 0    Visual Disturbances 0 0 0    Anxiety 3 3 5    Headache, Fullness in Head 3 3 2    Agitation 0 0 0    Orientation and Clouding of Sensorium 0  0 0    CIWA-Ar Total 7 8 11      Row Name 06/11/25 1929             CIWA-Ar    Blood Pressure --      Pulse --      Nausea and Vomiting --      Tactile Disturbances --      Tremor --      Auditory Disturbances --      Paroxysmal Sweats --      Visual Disturbances --      Anxiety --      Headache, Fullness in Head --      Agitation --      Orientation and Clouding of Sensorium --      CIWA-Ar Total --                      No data recorded                            History of Present Illness       Chief Complaint   Patient presents with    Alcohol Intoxication     Pt arrives from home with wife requesting detox from alcohol.        Past Medical History[1]   Past Surgical History[2]   Family History[3]   Social History[4]   E-Cigarette/Vaping    E-Cigarette Use Never User       E-Cigarette/Vaping Substances      I have reviewed and agree with the history as documented.     43 y/o male presents to the ED for evaluation of alcohol abuse, alcohol intoxication, and detox. He is accompanied by his wife, who assists with providing history.  She notes that she became concerned tonight because just prior to arrival the patient had an episode where he appeared to become pale and either passed out briefly or nearly passed out.  The patient states that he has been a functional alcoholic for approximately 22 years and has been able to maintain working despite his daily alcohol intake.  He drinks approximately half a handle of vodka per day, plus additional beer.  He notes that he has been trying to cut back on his alcohol intake on his own over the last 2 weeks due to recently having a friend die from complications related to alcohol and alcohol withdrawal.  He notes that over the last 2 weeks, while trying to wean himself off alcohol, he has been experiencing alcohol withdrawal symptoms of tremors and anxiousness, which prompted him to drink again.  He denies any history of delirium tremens or alcohol withdrawal seizures.  He  "does not emergency room seeing a near syncopal/syncopal episode tonight, however he does note that he has been dealing with intermittent lightheadedness and dizziness for \"a while\".  His wife reports that he has a history of a fall from a ladder over a year ago with head injury and concussion at that time.  He denies any recent falls or traumatic injuries since then.  He denies any SI, HI, or hallucinations.  No other physical symptoms or complaints.        Review of Systems   Constitutional:  Negative for chills and fever.   HENT:  Negative for congestion, rhinorrhea and sore throat.    Respiratory:  Negative for cough and shortness of breath.    Cardiovascular:  Negative for chest pain and palpitations.   Gastrointestinal:  Negative for abdominal pain, diarrhea, nausea and vomiting.   Genitourinary:  Negative for dysuria and hematuria.   Musculoskeletal:  Negative for back pain and neck pain.   Neurological:  Positive for dizziness and light-headedness. Negative for weakness, numbness and headaches.   Psychiatric/Behavioral:          Alcohol intoxication and alcohol abuse, see HPI.  No SI, HI, or hallucinations.   All other systems reviewed and are negative.          Objective       ED Triage Vitals   Temperature Pulse Blood Pressure Respirations SpO2 Patient Position - Orthostatic VS   06/11/25 1903 06/11/25 1903 06/11/25 1903 06/11/25 1903 06/11/25 1903 06/11/25 1903   98.2 °F (36.8 °C) 94 128/89 20 94 % Lying      Temp Source Heart Rate Source BP Location FiO2 (%) Pain Score    06/11/25 1903 06/11/25 1903 06/11/25 1903 -- 06/11/25 1935    Oral Monitor Right arm  1      Vitals      Date and Time Temp Pulse SpO2 Resp BP Pain Score FACES Pain Rating User   06/12/25 0030 -- 87 97 % 20 128/62 -- -- DS   06/12/25 0000 -- 95 95 % 20 119/83 -- -- DS   06/11/25 2330 -- 74 94 % 16 120/87 -- -- DS   06/11/25 2300 -- 87 95 % 16 115/70 -- -- DS   06/11/25 2230 -- 84 95 % 16 102/60 -- -- DS   06/11/25 2200 -- 91 94 % 20 " 110/65 -- -- JK   06/11/25 2130 -- 80 93 % 18 118/71 -- -- DS   06/11/25 2100 -- 81 92 % 16 118/83 -- -- DS   06/11/25 2045 -- 87 95 % 16 125/86 -- -- DS   06/11/25 2030 -- 86 -- -- 125/86 -- -- DS   06/11/25 1935 -- -- 94 % 16 132/91 1 -- DS   06/11/25 1930 -- 91 -- -- 132/91 -- -- DS   06/11/25 1903 98.2 °F (36.8 °C) 94 94 % 20 128/89 -- -- BM            Physical Exam  Vitals and nursing note reviewed.   Constitutional:       General: He is not in acute distress.     Appearance: Normal appearance. He is normal weight.   HENT:      Head: Normocephalic and atraumatic.      Right Ear: External ear normal.      Left Ear: External ear normal.      Nose: Nose normal.      Mouth/Throat:      Mouth: Mucous membranes are moist.      Pharynx: Oropharynx is clear. No oropharyngeal exudate or posterior oropharyngeal erythema.     Eyes:      Extraocular Movements: Extraocular movements intact.      Conjunctiva/sclera: Conjunctivae normal.      Pupils: Pupils are equal, round, and reactive to light.       Cardiovascular:      Rate and Rhythm: Normal rate and regular rhythm.      Pulses: Normal pulses.      Heart sounds: Normal heart sounds.   Pulmonary:      Effort: Pulmonary effort is normal. No respiratory distress.      Breath sounds: Normal breath sounds. No wheezing or rales.   Abdominal:      General: Abdomen is flat. Bowel sounds are normal. There is no distension.      Palpations: Abdomen is soft.      Tenderness: There is no abdominal tenderness. There is no right CVA tenderness, left CVA tenderness or guarding.     Musculoskeletal:         General: No swelling or tenderness. Normal range of motion.      Cervical back: Normal range of motion and neck supple. No tenderness.     Skin:     General: Skin is warm and dry.     Neurological:      General: No focal deficit present.      Mental Status: He is alert and oriented to person, place, and time.      Cranial Nerves: No cranial nerve deficit.      Sensory: No sensory  deficit.      Motor: No weakness.         Results Reviewed       Procedure Component Value Units Date/Time    POCT alcohol breath test [[id number]]  (Normal) Collected: 06/11/25 2328    Lab Status: Final result Updated: 06/11/25 2329     EXTBreath Alcohol 0.336    Vitamin B12 [[id number]]  (Normal) Collected: 06/11/25 1947    Lab Status: Final result Specimen: Blood from Arm, Right Updated: 06/11/25 2323     Vitamin B-12 503 pg/mL     Folate [[id number]]  (Normal) Collected: 06/11/25 1947    Lab Status: Final result Specimen: Blood from Arm, Right Updated: 06/11/25 2323     Folate 7.2 ng/mL     Rapid drug screen, urine [[id number]]  (Normal) Collected: 06/11/25 2013    Lab Status: Final result Specimen: Urine, Clean Catch Updated: 06/11/25 2038     Amph/Meth UR Negative     Barbiturate Ur Negative     Benzodiazepine Urine Negative     Cocaine Urine Negative     Methadone Urine Negative     Opiate Urine Negative     PCP Ur Negative     THC Urine Negative     Oxycodone Urine Negative     Fentanyl Urine Negative     HYDROCODONE URINE Negative    Narrative:      FOR MEDICAL PURPOSES ONLY.   IF CONFIRMATION NEEDED PLEASE CONTACT THE LAB WITHIN 5 DAYS.    Drug Screen Cutoff Levels:  AMPHETAMINE/METHAMPHETAMINES  1000 ng/mL  BARBITURATES     200 ng/mL  BENZODIAZEPINES     200 ng/mL  COCAINE      300 ng/mL  METHADONE      300 ng/mL  OPIATES      300 ng/mL  PHENCYCLIDINE     25 ng/mL  THC       50 ng/mL  OXYCODONE      100 ng/mL  FENTANYL      5 ng/mL  HYDROCODONE     300 ng/mL    Ammonia [[id number]]  (Normal) Collected: 06/11/25 1947    Lab Status: Final result Specimen: Blood from Arm, Right Updated: 06/11/25 2032     Ammonia 38 umol/L     Ethanol [[id number]]  (Abnormal) Collected: 06/11/25 1947    Lab Status: Final result Specimen: Blood from Arm, Right Updated: 06/11/25 2032     Ethanol Lvl 535 mg/dL     Acetaminophen level-If concentration is detectable, please discuss with medical  on call. [[id number]]   (Abnormal) Collected: 06/11/25 1947    Lab Status: Final result Specimen: Blood from Arm, Right Updated: 06/11/25 2032     Acetaminophen Level <2 ug/mL     Salicylate level [[id number]]  (Abnormal) Collected: 06/11/25 1947    Lab Status: Final result Specimen: Blood from Arm, Right Updated: 06/11/25 2032     Salicylate Lvl <5 mg/dL     Comprehensive metabolic panel [[id number]]  (Abnormal) Collected: 06/11/25 1947    Lab Status: Final result Specimen: Blood from Arm, Right Updated: 06/11/25 2032     Sodium 142 mmol/L      Potassium 3.4 mmol/L      Chloride 99 mmol/L      CO2 27 mmol/L      ANION GAP 16 mmol/L      BUN 8 mg/dL      Creatinine 0.73 mg/dL      Glucose 98 mg/dL      Calcium 9.0 mg/dL       U/L      ALT 92 U/L      Alkaline Phosphatase 50 U/L      Total Protein 7.9 g/dL      Albumin 5.0 g/dL      Total Bilirubin 0.75 mg/dL      eGFR 114 ml/min/1.73sq m     Narrative:      [employer] guidelines for Chronic Kidney Disease (CKD):     Stage 1 with normal or high GFR (GFR > 90 mL/min/1.73 square meters)    Stage 2 Mild CKD (GFR = 60-89 mL/min/1.73 square meters)    Stage 3A Moderate CKD (GFR = 45-59 mL/min/1.73 square meters)    Stage 3B Moderate CKD (GFR = 30-44 mL/min/1.73 square meters)    Stage 4 Severe CKD (GFR = 15-29 mL/min/1.73 square meters)    Stage 5 End Stage CKD (GFR <15 mL/min/1.73 square meters)  Note: GFR calculation is accurate only with a steady state creatinine    Lipase [[id number]]  (Normal) Collected: 06/11/25 1947    Lab Status: Final result Specimen: Blood from Arm, Right Updated: 06/11/25 2032     Lipase 52 u/L     Magnesium [[id number]]  (Abnormal) Collected: 06/11/25 1947    Lab Status: Final result Specimen: Blood from Arm, Right Updated: 06/11/25 2032     Magnesium 1.8 mg/dL     Phosphorus [[id number]]  (Normal) Collected: 06/11/25 1947    Lab Status: Final result Specimen: Blood from Arm, Right Updated: 06/11/25 2032     Phosphorus 4.5 mg/dL      Protime-INR [[id number]]  (Normal) Collected: 06/11/25 1947    Lab Status: Final result Specimen: Blood from Arm, Right Updated: 06/11/25 2006     Protime 12.4 seconds      INR 0.89    Narrative:      INR Therapeutic Range    Indication                                             INR Range      Atrial Fibrillation                                               2.0-3.0  Hypercoagulable State                                    2.0.2.3  Left Ventricular Asist Device                            2.0-3.0  Mechanical Heart Valve                                  -    Aortic(with afib, MI, embolism, HF, LA enlargement,    and/or coagulopathy)                                     2.0-3.0 (2.5-3.5)     Mitral                                                             2.5-3.5  Prosthetic/Bioprosthetic Heart Valve               2.0-3.0  Venous thromboembolism (VTE: VT, PE        2.0-3.0    APTT [[id number]]  (Normal) Collected: 06/11/25 1947    Lab Status: Final result Specimen: Blood from Arm, Right Updated: 06/11/25 2006     PTT 27 seconds     CBC and differential [[id number]]  (Abnormal) Collected: 06/11/25 1947    Lab Status: Final result Specimen: Blood from Arm, Right Updated: 06/11/25 1957     WBC 4.21 Thousand/uL      RBC 4.46 Million/uL      Hemoglobin 14.7 g/dL      Hematocrit 43.2 %      MCV 97 fL      MCH 33.0 pg      MCHC 34.0 g/dL      RDW 13.3 %      MPV 8.7 fL      Platelets 194 Thousands/uL      nRBC 0 /100 WBCs      Segmented % 41 %      Immature Grans % 0 %      Lymphocytes % 36 %      Monocytes % 19 %      Eosinophils Relative 3 %      Basophils Relative 1 %      Absolute Neutrophils 1.75 Thousands/µL      Absolute Immature Grans 0.01 Thousand/uL      Absolute Lymphocytes 1.53 Thousands/µL      Absolute Monocytes 0.78 Thousand/µL      Eosinophils Absolute 0.11 Thousand/µL      Basophils Absolute 0.03 Thousands/µL     Fingerstick Glucose (POCT) [[id number]]  (Normal) Collected: 06/11/25 1905    Lab Status: Final  result Specimen: Blood Updated: 06/11/25 1905     POC Glucose 120 mg/dl             XR chest 1 view portable   ED Interpretation by Simeon Eli MD (06/11 2114)   NAD      Final Interpretation by Nabeel Blake MD (06/11 2200)      No acute cardiopulmonary disease.            Workstation performed: [id number]         CT head without contrast   Final Interpretation by Noam Silva MD (06/11 2014)      No acute intracranial abnormality.                  Workstation performed: [id number]             Procedures    ED Medication and Procedure Management   Prior to Admission Medications   Prescriptions Last Dose Informant Patient Reported? Taking?   dicyclomine (BENTYL) 20 mg tablet Not Taking  No No   Sig: Take 1 tablet (20 mg total) by mouth 2 (two) times a day   Patient not taking: Reported on 6/11/2025   hydrOXYzine HCL (ATARAX) 25 mg tablet Not Taking  No No   Sig: Take 1 tablet (25 mg total) by mouth every 6 (six) hours   Patient not taking: Reported on 6/11/2025   meclizine (ANTIVERT) 25 mg tablet 6/10/2025  No Yes   Sig: Take 1 tablet (25 mg total) by mouth 3 (three) times a day as needed for dizziness      Facility-Administered Medications: None     Patient's Medications   Discharge Prescriptions    No medications on file     No discharge procedures on file.  ED SEPSIS DOCUMENTATION   Time reflects when diagnosis was documented in both MDM as applicable and the Disposition within this note       Time User Action Codes Description Comment    6/11/2025  9:10 PM Simeon Eli [F10.929] Acute alcohol intoxication (HCC)     6/11/2025  9:10 PM Simeon Eli [F10.10] Alcohol abuse                      [1]   Past Medical History:  Diagnosis Date    Tremor    [2] No past surgical history on file.  [3] No family history on file.  [4]   Social History  Tobacco Use    Smoking status: Every Day     Current packs/day: 0.50     Types: Cigarettes    Smokeless tobacco: Never   Vaping Use    Vaping  status: Never Used   Substance Use Topics    Alcohol use: Yes     Comment: daily    Drug use: No        Simeon Eli MD  06/12/25 0057

## 2025-06-12 ENCOUNTER — APPOINTMENT (INPATIENT)
Dept: VASCULAR ULTRASOUND | Facility: HOSPITAL | Age: 45
DRG: 897 | End: 2025-06-12

## 2025-06-12 PROBLEM — K04.7 DENTAL INFECTION: Status: ACTIVE | Noted: 2025-06-12

## 2025-06-12 PROBLEM — R55 SYNCOPE: Status: ACTIVE | Noted: 2025-06-12

## 2025-06-12 PROBLEM — F10.20 ALCOHOL USE DISORDER, SEVERE, DEPENDENCE (HCC): Status: ACTIVE | Noted: 2025-06-12

## 2025-06-12 PROBLEM — E87.29 ALCOHOLIC KETOACIDOSIS: Status: ACTIVE | Noted: 2025-06-12

## 2025-06-12 PROBLEM — F10.939 ALCOHOL WITHDRAWAL SYNDROME (HCC): Status: ACTIVE | Noted: 2025-06-12

## 2025-06-12 LAB
ALBUMIN SERPL BCG-MCNC: 4.5 G/DL (ref 3.5–5)
ALBUMIN SERPL BCG-MCNC: 4.8 G/DL (ref 3.5–5)
ALP SERPL-CCNC: 48 U/L (ref 34–104)
ALP SERPL-CCNC: 49 U/L (ref 34–104)
ALT SERPL W P-5'-P-CCNC: 86 U/L (ref 7–52)
ALT SERPL W P-5'-P-CCNC: 90 U/L (ref 7–52)
ANION GAP SERPL CALCULATED.3IONS-SCNC: 12 MMOL/L (ref 4–13)
AST SERPL W P-5'-P-CCNC: 164 U/L (ref 13–39)
AST SERPL W P-5'-P-CCNC: 178 U/L (ref 13–39)
ATRIAL RATE: 85 BPM
BASOPHILS # BLD AUTO: 0.03 THOUSANDS/ÂΜL (ref 0–0.1)
BASOPHILS NFR BLD AUTO: 1 % (ref 0–1)
BILIRUB DIRECT SERPL-MCNC: 0.31 MG/DL (ref 0–0.2)
BILIRUB SERPL-MCNC: 0.67 MG/DL (ref 0.2–1)
BILIRUB SERPL-MCNC: 0.95 MG/DL (ref 0.2–1)
BUN SERPL-MCNC: 8 MG/DL (ref 5–25)
CALCIUM SERPL-MCNC: 8.8 MG/DL (ref 8.4–10.2)
CHLORIDE SERPL-SCNC: 99 MMOL/L (ref 96–108)
CO2 SERPL-SCNC: 30 MMOL/L (ref 21–32)
CREAT SERPL-MCNC: 0.78 MG/DL (ref 0.6–1.3)
EOSINOPHIL # BLD AUTO: 0.13 THOUSAND/ÂΜL (ref 0–0.61)
EOSINOPHIL NFR BLD AUTO: 3 % (ref 0–6)
ERYTHROCYTE [DISTWIDTH] IN BLOOD BY AUTOMATED COUNT: 13.4 % (ref 11.6–15.1)
GFR SERPL CREATININE-BSD FRML MDRD: 111 ML/MIN/1.73SQ M
GLUCOSE SERPL-MCNC: 95 MG/DL (ref 65–140)
HCT VFR BLD AUTO: 42.6 % (ref 36.5–49.3)
HGB BLD-MCNC: 14.5 G/DL (ref 12–17)
IMM GRANULOCYTES # BLD AUTO: 0.01 THOUSAND/UL (ref 0–0.2)
IMM GRANULOCYTES NFR BLD AUTO: 0 % (ref 0–2)
LYMPHOCYTES # BLD AUTO: 1.27 THOUSANDS/ÂΜL (ref 0.6–4.47)
LYMPHOCYTES NFR BLD AUTO: 32 % (ref 14–44)
MAGNESIUM SERPL-MCNC: 2 MG/DL (ref 1.9–2.7)
MCH RBC QN AUTO: 33.4 PG (ref 26.8–34.3)
MCHC RBC AUTO-ENTMCNC: 34 G/DL (ref 31.4–37.4)
MCV RBC AUTO: 98 FL (ref 82–98)
MONOCYTES # BLD AUTO: 0.51 THOUSAND/ÂΜL (ref 0.17–1.22)
MONOCYTES NFR BLD AUTO: 13 % (ref 4–12)
NEUTROPHILS # BLD AUTO: 2.02 THOUSANDS/ÂΜL (ref 1.85–7.62)
NEUTS SEG NFR BLD AUTO: 51 % (ref 43–75)
NRBC BLD AUTO-RTO: 0 /100 WBCS
P AXIS: 33 DEGREES
PHOSPHATE SERPL-MCNC: 4.4 MG/DL (ref 2.7–4.5)
PLATELET # BLD AUTO: 192 THOUSANDS/UL (ref 149–390)
PMV BLD AUTO: 8.9 FL (ref 8.9–12.7)
POTASSIUM SERPL-SCNC: 3.6 MMOL/L (ref 3.5–5.3)
PR INTERVAL: 176 MS
PROT SERPL-MCNC: 7.4 G/DL (ref 6.4–8.4)
PROT SERPL-MCNC: 7.5 G/DL (ref 6.4–8.4)
QRS AXIS: -2 DEGREES
QRSD INTERVAL: 118 MS
QT INTERVAL: 402 MS
QTC INTERVAL: 478 MS
RBC # BLD AUTO: 4.34 MILLION/UL (ref 3.88–5.62)
SODIUM SERPL-SCNC: 141 MMOL/L (ref 135–147)
T WAVE AXIS: 17 DEGREES
T4 FREE SERPL-MCNC: 0.61 NG/DL (ref 0.61–1.12)
TSH SERPL DL<=0.05 MIU/L-ACNC: 6.18 UIU/ML (ref 0.45–4.5)
VENTRICULAR RATE: 85 BPM
WBC # BLD AUTO: 3.97 THOUSAND/UL (ref 4.31–10.16)

## 2025-06-12 PROCEDURE — 85025 COMPLETE CBC W/AUTO DIFF WBC: CPT

## 2025-06-12 PROCEDURE — 99223 1ST HOSP IP/OBS HIGH 75: CPT | Performed by: INTERNAL MEDICINE

## 2025-06-12 PROCEDURE — 84443 ASSAY THYROID STIM HORMONE: CPT

## 2025-06-12 PROCEDURE — 93880 EXTRACRANIAL BILAT STUDY: CPT | Performed by: SURGERY

## 2025-06-12 PROCEDURE — 80076 HEPATIC FUNCTION PANEL: CPT | Performed by: EMERGENCY MEDICINE

## 2025-06-12 PROCEDURE — 83735 ASSAY OF MAGNESIUM: CPT

## 2025-06-12 PROCEDURE — 80053 COMPREHEN METABOLIC PANEL: CPT

## 2025-06-12 PROCEDURE — 93880 EXTRACRANIAL BILAT STUDY: CPT

## 2025-06-12 PROCEDURE — 84439 ASSAY OF FREE THYROXINE: CPT

## 2025-06-12 PROCEDURE — 99255 IP/OBS CONSLTJ NEW/EST HI 80: CPT | Performed by: EMERGENCY MEDICINE

## 2025-06-12 PROCEDURE — 93010 ELECTROCARDIOGRAM REPORT: CPT | Performed by: INTERNAL MEDICINE

## 2025-06-12 PROCEDURE — 84100 ASSAY OF PHOSPHORUS: CPT

## 2025-06-12 RX ORDER — DIAZEPAM 10 MG/2ML
10 INJECTION, SOLUTION INTRAMUSCULAR; INTRAVENOUS
Status: DISCONTINUED | OUTPATIENT
Start: 2025-06-12 | End: 2025-06-15 | Stop reason: HOSPADM

## 2025-06-12 RX ORDER — DIAZEPAM 5 MG/1
10 TABLET ORAL EVERY 4 HOURS PRN
Status: DISCONTINUED | OUTPATIENT
Start: 2025-06-12 | End: 2025-06-15 | Stop reason: HOSPADM

## 2025-06-12 RX ORDER — LORAZEPAM 1 MG/1
2 TABLET ORAL ONCE
Status: COMPLETED | OUTPATIENT
Start: 2025-06-12 | End: 2025-06-12

## 2025-06-12 RX ORDER — AMOXICILLIN 250 MG/1
500 CAPSULE ORAL EVERY 8 HOURS SCHEDULED
Status: DISCONTINUED | OUTPATIENT
Start: 2025-06-12 | End: 2025-06-15 | Stop reason: HOSPADM

## 2025-06-12 RX ORDER — FOLIC ACID 1 MG/1
1 TABLET ORAL DAILY
Status: DISCONTINUED | OUTPATIENT
Start: 2025-06-12 | End: 2025-06-15 | Stop reason: HOSPADM

## 2025-06-12 RX ORDER — MAGNESIUM SULFATE HEPTAHYDRATE 40 MG/ML
2 INJECTION, SOLUTION INTRAVENOUS ONCE
Status: COMPLETED | OUTPATIENT
Start: 2025-06-12 | End: 2025-06-12

## 2025-06-12 RX ORDER — NICOTINE 21 MG/24HR
1 PATCH, TRANSDERMAL 24 HOURS TRANSDERMAL DAILY
Status: DISCONTINUED | OUTPATIENT
Start: 2025-06-12 | End: 2025-06-15 | Stop reason: HOSPADM

## 2025-06-12 RX ORDER — MECLIZINE HYDROCHLORIDE 25 MG/1
25 TABLET ORAL 3 TIMES DAILY PRN
Status: DISCONTINUED | OUTPATIENT
Start: 2025-06-12 | End: 2025-06-15 | Stop reason: HOSPADM

## 2025-06-12 RX ORDER — LANOLIN ALCOHOL/MO/W.PET/CERES
100 CREAM (GRAM) TOPICAL DAILY
Status: DISCONTINUED | OUTPATIENT
Start: 2025-06-12 | End: 2025-06-15 | Stop reason: HOSPADM

## 2025-06-12 RX ORDER — POTASSIUM CHLORIDE 1500 MG/1
20 TABLET, EXTENDED RELEASE ORAL ONCE
Status: COMPLETED | OUTPATIENT
Start: 2025-06-12 | End: 2025-06-12

## 2025-06-12 RX ORDER — SODIUM CHLORIDE, SODIUM GLUCONATE, SODIUM ACETATE, POTASSIUM CHLORIDE, MAGNESIUM CHLORIDE, SODIUM PHOSPHATE, DIBASIC, AND POTASSIUM PHOSPHATE .53; .5; .37; .037; .03; .012; .00082 G/100ML; G/100ML; G/100ML; G/100ML; G/100ML; G/100ML; G/100ML
100 INJECTION, SOLUTION INTRAVENOUS CONTINUOUS
Status: DISCONTINUED | OUTPATIENT
Start: 2025-06-12 | End: 2025-06-13

## 2025-06-12 RX ORDER — CHLORDIAZEPOXIDE HYDROCHLORIDE 25 MG/1
25 CAPSULE, GELATIN COATED ORAL EVERY 6 HOURS
Status: DISCONTINUED | OUTPATIENT
Start: 2025-06-12 | End: 2025-06-13

## 2025-06-12 RX ORDER — ACETAMINOPHEN 325 MG/1
650 TABLET ORAL EVERY 4 HOURS PRN
Status: DISCONTINUED | OUTPATIENT
Start: 2025-06-12 | End: 2025-06-15 | Stop reason: HOSPADM

## 2025-06-12 RX ORDER — ENOXAPARIN SODIUM 100 MG/ML
40 INJECTION SUBCUTANEOUS DAILY
Status: DISCONTINUED | OUTPATIENT
Start: 2025-06-12 | End: 2025-06-15 | Stop reason: HOSPADM

## 2025-06-12 RX ORDER — PANTOPRAZOLE SODIUM 40 MG/1
40 TABLET, DELAYED RELEASE ORAL
Status: DISCONTINUED | OUTPATIENT
Start: 2025-06-12 | End: 2025-06-15 | Stop reason: HOSPADM

## 2025-06-12 RX ORDER — MAGNESIUM HYDROXIDE/ALUMINUM HYDROXICE/SIMETHICONE 120; 1200; 1200 MG/30ML; MG/30ML; MG/30ML
30 SUSPENSION ORAL EVERY 6 HOURS PRN
Status: DISCONTINUED | OUTPATIENT
Start: 2025-06-12 | End: 2025-06-15 | Stop reason: HOSPADM

## 2025-06-12 RX ORDER — CHLORDIAZEPOXIDE HYDROCHLORIDE 25 MG/1
25 CAPSULE, GELATIN COATED ORAL EVERY 8 HOURS SCHEDULED
Status: DISCONTINUED | OUTPATIENT
Start: 2025-06-12 | End: 2025-06-12

## 2025-06-12 RX ADMIN — SODIUM CHLORIDE, SODIUM GLUCONATE, SODIUM ACETATE, POTASSIUM CHLORIDE, MAGNESIUM CHLORIDE, SODIUM PHOSPHATE, DIBASIC, AND POTASSIUM PHOSPHATE 100 ML/HR: .53; .5; .37; .037; .03; .012; .00082 INJECTION, SOLUTION INTRAVENOUS at 03:02

## 2025-06-12 RX ADMIN — CHLORDIAZEPOXIDE HYDROCHLORIDE 25 MG: 25 CAPSULE ORAL at 13:42

## 2025-06-12 RX ADMIN — AMOXICILLIN 500 MG: 250 CAPSULE ORAL at 12:10

## 2025-06-12 RX ADMIN — AMOXICILLIN 500 MG: 250 CAPSULE ORAL at 18:05

## 2025-06-12 RX ADMIN — ENOXAPARIN SODIUM 40 MG: 40 INJECTION SUBCUTANEOUS at 08:50

## 2025-06-12 RX ADMIN — NICOTINE 1 PATCH: 21 PATCH, EXTENDED RELEASE TRANSDERMAL at 08:50

## 2025-06-12 RX ADMIN — FOLIC ACID 1 MG: 1 TABLET ORAL at 08:50

## 2025-06-12 RX ADMIN — SODIUM CHLORIDE, SODIUM GLUCONATE, SODIUM ACETATE, POTASSIUM CHLORIDE, MAGNESIUM CHLORIDE, SODIUM PHOSPHATE, DIBASIC, AND POTASSIUM PHOSPHATE 100 ML/HR: .53; .5; .37; .037; .03; .012; .00082 INJECTION, SOLUTION INTRAVENOUS at 12:10

## 2025-06-12 RX ADMIN — AMOXICILLIN 500 MG: 250 CAPSULE ORAL at 22:47

## 2025-06-12 RX ADMIN — DIAZEPAM 10 MG: 10 INJECTION, SOLUTION INTRAMUSCULAR; INTRAVENOUS at 18:05

## 2025-06-12 RX ADMIN — LORAZEPAM 2 MG: 1 TABLET ORAL at 12:10

## 2025-06-12 RX ADMIN — AMOXICILLIN 500 MG: 250 CAPSULE ORAL at 03:01

## 2025-06-12 RX ADMIN — Medication 1 TABLET: at 08:50

## 2025-06-12 RX ADMIN — MECLIZINE HYDROCHLORIDE 25 MG: 25 TABLET ORAL at 06:30

## 2025-06-12 RX ADMIN — DIAZEPAM 10 MG: 5 TABLET ORAL at 18:52

## 2025-06-12 RX ADMIN — ACETAMINOPHEN 650 MG: 325 TABLET, FILM COATED ORAL at 06:27

## 2025-06-12 RX ADMIN — THIAMINE HCL TAB 100 MG 100 MG: 100 TAB at 08:50

## 2025-06-12 RX ADMIN — SODIUM CHLORIDE, SODIUM GLUCONATE, SODIUM ACETATE, POTASSIUM CHLORIDE, MAGNESIUM CHLORIDE, SODIUM PHOSPHATE, DIBASIC, AND POTASSIUM PHOSPHATE 100 ML/HR: .53; .5; .37; .037; .03; .012; .00082 INJECTION, SOLUTION INTRAVENOUS at 20:58

## 2025-06-12 RX ADMIN — MAGNESIUM SULFATE HEPTAHYDRATE 2 G: 40 INJECTION, SOLUTION INTRAVENOUS at 03:02

## 2025-06-12 RX ADMIN — CHLORDIAZEPOXIDE HYDROCHLORIDE 25 MG: 25 CAPSULE ORAL at 08:50

## 2025-06-12 RX ADMIN — DIAZEPAM 10 MG: 5 TABLET ORAL at 23:02

## 2025-06-12 RX ADMIN — PANTOPRAZOLE SODIUM 40 MG: 40 TABLET, DELAYED RELEASE ORAL at 08:50

## 2025-06-12 RX ADMIN — POTASSIUM CHLORIDE 20 MEQ: 1500 TABLET, EXTENDED RELEASE ORAL at 03:02

## 2025-06-12 RX ADMIN — LORAZEPAM 2 MG: 1 TABLET ORAL at 13:42

## 2025-06-12 RX ADMIN — CHLORDIAZEPOXIDE HYDROCHLORIDE 25 MG: 25 CAPSULE ORAL at 20:04

## 2025-06-12 NOTE — H&P
H&P - Hospitalist   Name: Balaji Hamlin 44 y.o. male I MRN: [id number]  Unit/Bed#: -01 I Date of Admission: 6/11/2025   Date of Service: 6/12/2025 I Hospital Day: 0     Assessment & Plan  Alcohol use disorder, severe, dependence (HCC)  Alcoholic ketoacidosis  Patient with hx of heavy alcohol use, intoxicated on admission, ethanol level 535, anion gap 16  Reports drinking a half handle of vodka and an unspecified amount of beer daily.  Last drink 7 PM 6/11/2025.  No symptoms on admission of withdrawal.  Reports experiencing symptoms approximately 12 to 14 hours after cessation.  Toxicology consulted. CRS consulted.  Initiate IVF, thiamine, folic acid, MVI.  CIWA protocol.   Syncope  -Syncopal episode lasting several mins with +/- LOC  -Patient does not recall the episode.  States he was on the phone prior.  -EKG: Normal sinus nonischemic.  -CXR: No acute cardiopulmonary abnormality.  -Head CT: No acute intracranial abnormality.  -Labs remarkable for ethanol 535.  Potassium 3.4.  Anion gap 16.  Elevated liver enzymes.  -Last echocardiogram: Not in records  Plan:  >Admit to medicine for workup for syncope. Monitor on telemetry for arrhythmia. Order 2D echocardiogram as well as U/S carotids. Check orthostatics.   >Neuro checks q4h  >IVF  >Consider Cardiology and/or Neurology consultation in AM   >Tylenol prn pain, tigan prn nausea   >Check tsh  >Daily cbc, bmp    Dental infection  Has been taking amoxicillin following a recent dental procedure.  Continue.      VTE Pharmacologic Prophylaxis: VTE Score: 4 Moderate Risk (Score 3-4) - Pharmacological DVT Prophylaxis Ordered: enoxaparin (Lovenox).  Code Status: No Order level 1  Discussion with family: Updated  (wife) at bedside.    Anticipated Length of Stay: Patient will be admitted on an inpatient basis with an anticipated length of stay of greater than 2 midnights secondary to alcohol detoxification.    History of Present Illness   Chief Complaint:  Seeking alcohol detoxification    Balaji Hamlin is a 44 y.o. male with a PMH of alcohol abuse disorder, tobacco abuse, who presents to [facility] seeking alcohol detoxification.  Patient presents with his wife who contributes to history as well.  Patient's wife states that the patient had a syncopal episode earlier in the evening.  She states he was talking on the phone and then saw him laying back and turning pale.  She states he has had several episodes like this in the past few weeks.  She states he has also been intermittently confused over this period of time.  Patient reports he has been drinking heavily for over 20 years.  He states he drinks a half handle of vodka daily and an unspecified amount of beer with this.  He states he has been trying to cut back on his alcohol use, however, he has noticed that he begins experiencing withdrawal symptoms earlier in earlier.  He states he begins to experience tremors and anxiety within about 12 to 14 hours following his last drink.  He states he last drank at 7 PM this evening, 6/11/2025.  Patient admits to confusion and dizziness.  He states he has been taking meclizine for the dizziness but it does not help.  He states he had an incident about a year ago where he fell from a ladder and had a concussion.  He denies any other complaints at this time including suicidal ideation, homicidal ideation, hallucinations.      Review of Systems   Constitutional:  Negative for chills and fever.   HENT:  Negative for ear pain and sore throat.    Eyes:  Negative for pain and visual disturbance.   Respiratory:  Negative for cough and shortness of breath.    Cardiovascular:  Negative for chest pain and palpitations.   Gastrointestinal:  Negative for abdominal pain and vomiting.   Genitourinary:  Negative for dysuria and hematuria.   Musculoskeletal:  Negative for arthralgias and back pain.   Skin:  Negative for color change and rash.   Neurological:  Positive for  dizziness and light-headedness. Negative for seizures and syncope.   Psychiatric/Behavioral:  Positive for confusion. Negative for suicidal ideas.    All other systems reviewed and are negative.      Historical Information   Past Medical History[1]  Past Surgical History[2]  Social History[3]  E-Cigarette/Vaping    E-Cigarette Use Never User      E-Cigarette/Vaping Substances     Family History[4]  Social History:  Marital Status: /Civil Union   Occupation: Contractor  Patient Pre-hospital Living Situation: Home, With spouse  Patient Pre-hospital Level of Mobility: walks  Patient Pre-hospital Diet Restrictions: None    Meds/Allergies   I have reviewed home medications with patient personally.  Prior to Admission medications    Medication Sig Start Date End Date Taking? Authorizing Provider   meclizine (ANTIVERT) 25 mg tablet Take 1 tablet (25 mg total) by mouth 3 (three) times a day as needed for dizziness 7/31/24  Yes Kala Lowe MD   dicyclomine (BENTYL) 20 mg tablet Take 1 tablet (20 mg total) by mouth 2 (two) times a day  Patient not taking: Reported on 6/11/2025 4/7/22   Imani Lea MD   hydrOXYzine HCL (ATARAX) 25 mg tablet Take 1 tablet (25 mg total) by mouth every 6 (six) hours  Patient not taking: Reported on 6/11/2025 7/31/24   Kala Lowe MD     No Known Allergies    Objective :  Temp:  [98.2 °F (36.8 °C)] 98.2 °F (36.8 °C)  HR:  [74-95] 87  BP: (102-132)/(60-91) 128/62  Resp:  [16-20] 20  SpO2:  [92 %-97 %] 97 %  O2 Device: None (Room air)  Nasal Cannula O2 Flow Rate (L/min):  [1 L/min] 1 L/min    Physical Exam  Vitals and nursing note reviewed.   Constitutional:       General: He is not in acute distress.     Appearance: He is well-developed.   HENT:      Head: Normocephalic and atraumatic.     Eyes:      Conjunctiva/sclera: Conjunctivae normal.       Cardiovascular:      Rate and Rhythm: Normal rate and regular rhythm.      Heart sounds: No murmur heard.  Pulmonary:  "     Effort: Pulmonary effort is normal. No respiratory distress.      Breath sounds: Normal breath sounds.   Abdominal:      Palpations: Abdomen is soft.      Tenderness: There is no abdominal tenderness.     Musculoskeletal:         General: No swelling.      Cervical back: Neck supple.     Skin:     General: Skin is warm and dry.     Neurological:      General: No focal deficit present.      Mental Status: He is alert and oriented to person, place, and time.     Psychiatric:         Mood and Affect: Mood normal.          Lines/Drains:            Lab Results: I have reviewed the following results:  Results from last 7 days   Lab Units 06/11/25 1947   WBC Thousand/uL 4.21*   HEMOGLOBIN g/dL 14.7   HEMATOCRIT % 43.2   PLATELETS Thousands/uL 194   SEGS PCT % 41*   LYMPHO PCT % 36   MONO PCT % 19*   EOS PCT % 3     Results from last 7 days   Lab Units 06/11/25 1947   SODIUM mmol/L 142   POTASSIUM mmol/L 3.4*   CHLORIDE mmol/L 99   CO2 mmol/L 27   BUN mg/dL 8   CREATININE mg/dL 0.73   ANION GAP mmol/L 16*   CALCIUM mg/dL 9.0   ALBUMIN g/dL 5.0   TOTAL BILIRUBIN mg/dL 0.75   ALK PHOS U/L 50   ALT U/L 92*   AST U/L 194*   GLUCOSE RANDOM mg/dL 98     Results from last 7 days   Lab Units 06/11/25  1947   INR  0.89     Results from last 7 days   Lab Units 06/11/25  1905   POC GLUCOSE mg/dl 120     No results found for: \"HGBA1C\"        Imaging Results Review: I personally reviewed the following image studies in PACS and associated radiology reports: chest xray and CT head. My interpretation of the radiology images/reports is: No acute cardiopulmonary or intracranial abnormalities..      Administrative Statements   I have spent a total time of 77 minutes in caring for this patient on the day of the visit/encounter including Diagnostic results, Patient and family education, Documenting in the medical record, Reviewing/placing orders in the medical record (including tests, medications, and/or procedures), Obtaining or " reviewing history  , and Communicating with other healthcare professionals .    ** Please Note: This note has been constructed using a voice recognition system. **         [1]   Past Medical History:  Diagnosis Date    Tremor    [2] No past surgical history on file.  [3]   Social History  Tobacco Use    Smoking status: Every Day     Current packs/day: 0.50     Types: Cigarettes    Smokeless tobacco: Never   Vaping Use    Vaping status: Never Used   Substance and Sexual Activity    Alcohol use: Yes     Comment: daily    Drug use: No   [4] No family history on file.

## 2025-06-12 NOTE — ASSESSMENT & PLAN NOTE
Patient with hx of heavy alcohol use, intoxicated on admission, ethanol level 535, anion gap 16  Reports drinking a half handle of vodka and an unspecified amount of beer daily.  Last drink 7 PM 6/11/2025.  No symptoms on admission of withdrawal.  Reports experiencing symptoms approximately 12 to 14 hours after cessation.  Toxicology consulted. CRS consulted.  Initiate IVF, thiamine, folic acid, MVI.  WA protocol.

## 2025-06-12 NOTE — TELEMEDICINE
Tele-Consultation - Medical Toxicology   Name: Balaji Hamlin 44 y.o. male I MRN: [id number]  Unit/Bed#: -01 I Date of Admission: 6/11/2025   Date of Service: 6/12/2025 I Hospital Day: 0   Inpatient consult to Toxicology  Consult performed by: Nabeel Sanches DO  Consult ordered by: Gita Webster MD        Physician Requesting Evaluation: Gita Webster MD   Reason for Evaluation / Principal Problem: Alcohol use disorder, alcohol withdrawal    Assessment & Plan  Alcohol use disorder, severe, dependence (HCC)    Continue daily vitamin supplementation with thiamine, folic acid, and multivitamin  Appreciate certified  and case management consultations for recommendations regarding aftercare resources, recovery support and disposition planning  Patient is contemplating naltrexone.  We discussed this and he is considering it.  He denies any opioid, kratom, tramadol use.  Please see below for dosing information if he decides that he would like to pursue it:  Patient does have a transaminitis, would check LFTs later today to ensure that they are coming down.  I have placed an order for hepatic function panel.  As long as the LFTs are coming down it would be okay to start naltrexone  First dose is 25 mg on day 1  Day 2 is the maintenance dosing of 50 mg  Alcohol withdrawal syndrome (HCC)  Recommend continued symptom-triggered management via CIWA with either phenobarbital or diazepam.  For phenobarbital dosing, can use:   130 mg IV for mild to moderate symptoms and phenobarbital 260 mg IV for moderate to severe symptoms.  Titrate to RASS -1 and hold for significant sedation  Maximum total dose of phenobarbital is 2 grams.  if patient is approaching 2 grams of phenobarbital with continued withdrawal symptoms:  Recommend escalation of care for adjunctive use of dexmedetomidine or ketamine infusions  Can start ketamine infusion at 0.3 mg/kg/hr for 24 hours. Beyond this please  "use 0.15 mg/kg/hr until clinical improvement  If using dexmedetomidine infusion, please use adjunctive benzodiazepines as dexmedetomidine has no direct effect at the CAM receptor  Alternatively can also use a diazepam-based CIWA. Please see below for dosing regimen.       Medical Toxicology recommendations for CIWA monitoring using diazepam for treatment:    CIWA score & Treatment     Monitoring:   Modified CIWA Score   Telemetry  Continuous pulse oximetry   Initiate RASS with dosing and hold any sedatives for RASS less than -2  Request provider re-evaluation after every three doses.  Step down for CIWA > 7  Contact Medical Toxicology/Addiction \"Network Detox AP On Call\" via Tiger Text for worsening CIWA, persistent tachycardia or encephalopathy.       0  No intervention  Reassess q4 hours.   Consider discontinuing CIWA 24 hours after ethanol concentration of zero, with a score of zero    1-7  Diazepam 10 mg PO Q4hr PRN score 1-7  Reassess q4hr    8-14  Diazepam 10mg IV Q1hr PRN score 8-14  Reassess q1hr  Contact Medical Toxicology/Addiction \"Network Detox AP On Call\" via Tiger Text to discuss transfer to detox unit, step down or critical care per Detox AP.    15-19  Diazepam 20mg IV Q1hr PRN score 15-19  Reassess q1hr  Contact Medical Toxicology/Addiction \"Network Detox AP On Call\" via Tiger Text to discuss transfer to detox unit, step down or critical care per Detox AP and further treatment recommendations.    >20  Diazepam 40mg IV Q1hr PRN score > 20  Contact Medical Toxicology/Addiction \"Network Detox AP On Call\" via Tiger Text for additional treatment recommendations.       Once the patient's withdrawal is effectively managed, the patient can be placed on a diazepam taper, if needed.    Diazepam can be decreased by 1/2 of the last dose every hour until the patient is not needing more than 10 mg at a time; at which point diazepam 5mg PO  may be given Q6 hours PRN for 24 hours. Prior to discontinuation. " "    Please reach out to Medical Toxicology/Addiction \"Network Detox AP On Call\" via Tiger Text with any additional concerns.     Syncope    Alcoholic ketoacidosis  Anion gap has resolved, bicarbonate normal  Dental infection    I have discussed the above management plan in detail with the primary service.   Please contact the SecureChat role for the Medical Toxicology service with any questions/concerns.        For further questions, please contact the medical  on call via SecureChat between 8am and 9pm. If between 9pm and 8am, please reach out to the Poison Center at [phone].     History of Present Illness   Balaji Hamlin is a 44 y.o. year old male who presents with medical intoxication seeking assistance with detoxification.  Patient says that he drinks every day.  Sometimes several beers, sometimes half a bottle of vodka.  He says that by 3-4 o'clock the next day he starts not feeling well and will get palpitations, shakiness.  He then starts drinking again.  This is his pattern and feels that it is getting out of control.  He has not been to rehab before and has not been on any medications for alcohol use disorder.  Occasionally will smoke some cigarettes.  He denies any opioid, tramadol, kratom, fentanyl use.  Right now he is feeling improved.  Earlier he did feel nauseous.  He does have decreased p.o. intake but has been drinking water and had a little bit of grilled cheese earlier.      Spoke with RN who reports that he did have some tremulousness but has improved after Librium and Ativan.    Review of Systems   Constitutional:  Positive for appetite change. Negative for fever.   Respiratory:  Negative for chest tightness and shortness of breath.    Cardiovascular:  Negative for chest pain and palpitations.   Gastrointestinal:  Positive for nausea (resolved). Negative for abdominal pain, diarrhea and vomiting.   Neurological:  Positive for tremors (improved). Negative for seizures, facial " asymmetry, speech difficulty and weakness.   Psychiatric/Behavioral:  Negative for agitation and hallucinations.        Historical Information   Medical History Review: I have reviewed the patient's PMH, PSH, Social History, Family History, Meds, and Allergies   Social History[1]  Family History[2]    Meds/Allergies   Prior to Admission medications    Medication Sig Start Date End Date Taking? Authorizing Provider   meclizine (ANTIVERT) 25 mg tablet Take 1 tablet (25 mg total) by mouth 3 (three) times a day as needed for dizziness 7/31/24  Yes Kala Lowe MD   dicyclomine (BENTYL) 20 mg tablet Take 1 tablet (20 mg total) by mouth 2 (two) times a day  Patient not taking: Reported on 6/11/2025 4/7/22   Imani Lea MD   hydrOXYzine HCL (ATARAX) 25 mg tablet Take 1 tablet (25 mg total) by mouth every 6 (six) hours  Patient not taking: Reported on 6/11/2025 7/31/24   Kala Lowe MD   Current Medications[3]   Allergies[4]    Objective :  Temp:  [98.1 °F (36.7 °C)-98.5 °F (36.9 °C)] 98.1 °F (36.7 °C)  HR:  [68-95] 81  BP: (102-132)/(60-93) 130/93  Resp:  [13-20] 18  SpO2:  [92 %-99 %] 99 %  O2 Device: None (Room air)  Nasal Cannula O2 Flow Rate (L/min):  [1 L/min] 1 L/min      Intake/Output Summary (Last 24 hours) at 6/12/2025 1443  Last data filed at 6/12/2025 0501  Gross per 24 hour   Intake 370 ml   Output --   Net 370 ml       Physical Exam  Vitals and nursing note reviewed.   Constitutional:       General: He is not in acute distress.     Appearance: He is not diaphoretic.      Interventions: He is not intubated.  HENT:      Head: Normocephalic and atraumatic.     Eyes:      Extraocular Movements: Extraocular movements intact.     Pulmonary:      Effort: No tachypnea, accessory muscle usage, respiratory distress or retractions. He is not intubated.     Musculoskeletal:      Cervical back: Full passive range of motion without pain and neck supple.     Neurological:      Mental Status: He is  alert.      GCS: GCS eye subscore is 4. GCS verbal subscore is 5. GCS motor subscore is 6.      Cranial Nerves: No dysarthria or facial asymmetry.      Motor: No abnormal muscle tone or seizure activity.      Comments: No tongue fasciculations  Confused on date, but alert to month and year   Psychiatric:         Behavior: Behavior is cooperative.           Lab Results: I have reviewed the following results:  Results from last 7 days   Lab Units 06/12/25  0542 06/11/25 1947   WBC Thousand/uL 3.97* 4.21*   HEMOGLOBIN g/dL 14.5 14.7   HEMATOCRIT % 42.6 43.2   PLATELETS Thousands/uL 192 194   SEGS PCT % 51 41*   LYMPHO PCT % 32 36   MONO PCT % 13* 19*   EOS PCT % 3 3      Results from last 7 days   Lab Units 06/12/25  0542 06/11/25 1947   POTASSIUM mmol/L 3.6 3.4*   CHLORIDE mmol/L 99 99   CO2 mmol/L 30 27   BUN mg/dL 8 8   CREATININE mg/dL 0.78 0.73   CALCIUM mg/dL 8.8 9.0   ALBUMIN g/dL 4.8 5.0   ALK PHOS U/L 48 50   ALT U/L 90* 92*   AST U/L 178* 194*   MAGNESIUM mg/dL 2.0 1.8*   PHOSPHORUS mg/dL 4.4 4.5      Results from last 7 days   Lab Units 06/11/25 1947   INR  0.89   PTT seconds 27                  Results from last 7 days   Lab Units 06/11/25 1947   ACETAMINOPHEN LVL ug/mL <2*   ETHANOL LVL mg/dL 535*   SALICYLATE LVL mg/dL <5*     Imaging Results Review: I reviewed radiology reports from this admission including: chest xray, CT head, and Ultrasound(s).  Other Study Results Review: EKG was personally reviewed and my interpretation is: NSR. HR 85. Normal axid, QTC prolonged. No STEMI..    Administrative Statements       Administrative Statements   VIRTUAL CARE DOCUMENTATION:     1. This service was provided via Telemedicine using Teams Virtual Rounding      2. Parties in the room with patient during teleconsult RN    3. Confidentiality My office door was closed     4. Participants No one else was in the room    5. Patient acknowledged consent and understanding of privacy and security of the  Telemedicine  consult. I informed the patient that I have reviewed their record in Epic and presented the opportunity for them to ask any questions regarding the visit today.  The patient agreed to participate.    6. I have spent a total time of 35 minutes in caring for this patient on the day of the visit/encounter including Diagnostic results, Prognosis, Risks and benefits of tx options, Instructions for management, Patient and family education, Importance of tx compliance, Risk factor reductions, Impressions, Counseling / Coordination of care, Documenting in the medical record, Reviewing/placing orders in the medical record (including tests, medications, and/or procedures), Obtaining or reviewing history  , and Communicating with other healthcare professionals , not including the time spent for establishing the audio/video connection.          [1]   Social History  Tobacco Use    Smoking status: Every Day     Current packs/day: 0.50     Types: Cigarettes    Smokeless tobacco: Never   Vaping Use    Vaping status: Never Used   Substance and Sexual Activity    Alcohol use: Yes     Comment: daily    Drug use: No   [2] No family history on file.  [3]   Current Facility-Administered Medications:     acetaminophen (TYLENOL) tablet 650 mg, 650 mg, Oral, Q4H PRN, Damion Sanches PA-C, 650 mg at 06/12/25 0627    aluminum-magnesium hydroxide-simethicone (MAALOX) oral suspension 30 mL, 30 mL, Oral, Q6H PRN, Damion Sanches PA-C    amoxicillin (AMOXIL) capsule 500 mg, 500 mg, Oral, Q8H REECE, Damion Sanches PA-C, 500 mg at 06/12/25 1210    chlordiazePOXIDE (LIBRIUM) capsule 25 mg, 25 mg, Oral, Q6H, Gita Webster MD, 25 mg at 06/12/25 1342    enoxaparin (LOVENOX) subcutaneous injection 40 mg, 40 mg, Subcutaneous, Daily, Damion Sanches PA-C, 40 mg at 06/12/25 0850    folic acid (FOLVITE) tablet 1 mg, 1 mg, Oral, Daily, Damion Sanches PA-C, 1 mg at 06/12/25 0850    meclizine (ANTIVERT) tablet 25 mg, 25 mg, Oral, TID PRN, Damion  SAMMY Sanches, 25 mg at 06/12/25 0630    multi-electrolyte (Plasmalyte-A/Isolyte-S PH 7.4/Normosol-R) IV solution, 100 mL/hr, Intravenous, Continuous, Gita Webster MD, Last Rate: 100 mL/hr at 06/12/25 1210, 100 mL/hr at 06/12/25 1210    multivitamin-minerals (CENTRUM) tablet 1 tablet, 1 tablet, Oral, Daily, Damion Sanches PA-C, 1 tablet at 06/12/25 0850    nicotine (NICODERM CQ) 21 mg/24 hr TD 24 hr patch 1 patch, 1 patch, Transdermal, Daily, Damion Sanches PA-C, 1 patch at 06/12/25 0850    pantoprazole (PROTONIX) EC tablet 40 mg, 40 mg, Oral, Early Morning, Gita Webster MD, 40 mg at 06/12/25 0850    thiamine tablet 100 mg, 100 mg, Oral, Daily, Damion Sanches PA-C, 100 mg at 06/12/25 0850    trimethobenzamide (TIGAN) IM injection 200 mg, 200 mg, Intramuscular, Q6H PRN, Damion Sanches PA-C  [4] No Known Allergies

## 2025-06-12 NOTE — CASE MANAGEMENT
Case Management Progress Note    Patient name Balaji Hamlin  Location /-01 MRN [id number]  : 1980 Date 2025       LOS (days): 0  Geometric Mean LOS (GMLOS) (days):   Days to GMLOS:        OBJECTIVE:    Current admission status: Inpatient  Preferred Pharmacy:   [facility]/pharm[facility] #[id number] - [place] [id number] [address]  Phone: [phone] Fax: [phone]    Primary Care Provider: No primary care provider on file.    Primary Insurance:   Secondary Insurance:     PROGRESS NOTE:  Referral made to MICHELA, who will be on the unit later to meet with the patient bedside to discuss D&A resources.

## 2025-06-12 NOTE — PLAN OF CARE
Problem: SAFETY ADULT  Goal: Patient will remain free of falls  Description: INTERVENTIONS:  - Educate patient/family on patient safety including physical limitations  - Instruct patient to call for assistance with activity   - Consider consulting OT/PT to assist with strengthening/mobility based on AM PAC & JH-HLM score  - Consult OT/PT to assist with strengthening/mobility   - Keep Call bell within reach  - Keep bed low and locked with side rails adjusted as appropriate  - Keep care items and personal belongings within reach  - Initiate and maintain comfort rounds  - Make Fall Risk Sign visible to staff  - Offer Toileting every 2 Hours, in advance of need  - Initiate/Maintain bed alarm  - Obtain necessary fall risk management equipment: yellow socks  - Apply yellow socks and bracelet for high fall risk patients  - Consider moving patient to room near nurses station  6/12/2025 0537 by Suyapa Moseley RN  Outcome: Progressing  6/12/2025 0212 by Suyapa Moseley RN  Outcome: Progressing  Goal: Maintain or return to baseline ADL function  Description: INTERVENTIONS:  -  Assess patient's ability to carry out ADLs; assess patient's baseline for ADL function and identify physical deficits which impact ability to perform ADLs (bathing, care of mouth/teeth, toileting, grooming, dressing, etc.)  - Assess/evaluate cause of self-care deficits   - Assess range of motion  - Assess patient's mobility; develop plan if impaired  - Assess patient's need for assistive devices and provide as appropriate  - Encourage maximum independence but intervene and supervise when necessary  - Involve family in performance of ADLs  - Assess for home care needs following discharge   - Consider OT consult to assist with ADL evaluation and planning for discharge  - Provide patient education as appropriate  - Monitor functional capacity and physical performance, use of AM PAC & JH-HLM   - Monitor gait, balance and fatigue with  ambulation    6/12/2025 0537 by Suyapa Moseley RN  Outcome: Progressing  6/12/2025 0212 by Suyapa Moseley RN

## 2025-06-12 NOTE — PLAN OF CARE
Problem: PAIN - ADULT  Goal: Verbalizes/displays adequate comfort level or baseline comfort level  Description: Interventions:  - Encourage patient to monitor pain and request assistance  - Assess pain using appropriate pain scale  - Administer analgesics as ordered based on type and severity of pain and evaluate response  - Implement non-pharmacological measures as appropriate and evaluate response  - Consider cultural and social influences on pain and pain management  - Notify physician/advanced practitioner if interventions unsuccessful or patient reports new pain  - Educate patient/family on pain management process including their role and importance of  reporting pain   - Provide non-pharmacologic/complimentary pain relief interventions  Outcome: Progressing     Problem: INFECTION - ADULT  Goal: Absence or prevention of progression during hospitalization  Description: INTERVENTIONS:  - Assess and monitor for signs and symptoms of infection  - Monitor lab/diagnostic results  - Monitor all insertion sites, i.e. indwelling lines, tubes, and drains  - Monitor endotracheal if appropriate and nasal secretions for changes in amount and color  - [place] appropriate cooling/warming therapies per order  - Administer medications as ordered  - Instruct and encourage patient and family to use good hand hygiene technique  - Identify and instruct in appropriate isolation precautions for identified infection/condition  Outcome: Progressing  Goal: Absence of fever/infection during neutropenic period  Description: INTERVENTIONS:  - Monitor WBC  - Perform strict hand hygiene  - Limit to healthy visitors only  - No plants, dried, fresh or silk flowers with cohen in patient room  Outcome: Progressing     Problem: SAFETY ADULT  Goal: Patient will remain free of falls  Description: INTERVENTIONS:  - Educate patient/family on patient safety including physical limitations  - Instruct patient to call for assistance with activity   -  Consider consulting OT/PT to assist with strengthening/mobility based on AM PAC & JH-HLM score  - Consult OT/PT to assist with strengthening/mobility   - Keep Call bell within reach  - Keep bed low and locked with side rails adjusted as appropriate  - Keep care items and personal belongings within reach  - Initiate and maintain comfort rounds  - Make Fall Risk Sign visible to staff  - Offer Toileting every 2 Hours, in advance of need  - Initiate/Maintain bed alarm  - Obtain necessary fall risk management equipment: yellow socks  - Apply yellow socks and bracelet for high fall risk patients  - Consider moving patient to room near nurses station  Outcome: Progressing  Goal: Maintain or return to baseline ADL function  Description: INTERVENTIONS:  -  Assess patient's ability to carry out ADLs; assess patient's baseline for ADL function and identify physical deficits which impact ability to perform ADLs (bathing, care of mouth/teeth, toileting, grooming, dressing, etc.)  - Assess/evaluate cause of self-care deficits   - Assess range of motion  - Assess patient's mobility; develop plan if impaired  - Assess patient's need for assistive devices and provide as appropriate  - Encourage maximum independence but intervene and supervise when necessary  - Involve family in performance of ADLs  - Assess for home care needs following discharge   - Consider OT consult to assist with ADL evaluation and planning for discharge  - Provide patient education as appropriate  - Monitor functional capacity and physical performance, use of AM PAC & JH-HLM   - Monitor gait, balance and fatigue with ambulation    Outcome: Progressing  Goal: Maintains/Returns to pre admission functional level  Description: INTERVENTIONS:  - Perform AM-PAC 6 Click Basic Mobility/ Daily Activity assessment daily.  - Set and communicate daily mobility goal to care team and patient/family/caregiver.   - Collaborate with rehabilitation services on mobility goals if  consulted  - Perform Range of Motion 3 times a day.  - Reposition patient every 2 hours.  - Dangle patient 3 times a day  - Stand patient 3 times a day  - Ambulate patient 3 times a day  - Out of bed to chair 3 times a day   - Out of bed for meals 3 times a day  - Out of bed for toileting  - Record patient progress and toleration of activity level   Outcome: Progressing     Problem: DISCHARGE PLANNING  Goal: Discharge to home or other facility with appropriate resources  Description: INTERVENTIONS:  - Identify barriers to discharge w/patient and caregiver  - Arrange for needed discharge resources and transportation as appropriate  - Identify discharge learning needs (meds, wound care, etc.)  - Arrange for interpretive services to assist at discharge as needed  - Refer to Case Management Department for coordinating discharge planning if the patient needs post-hospital services based on physician/advanced practitioner order or complex needs related to functional status, cognitive ability, or social support system  Outcome: Progressing     Problem: Knowledge Deficit  Goal: Patient/family/caregiver demonstrates understanding of disease process, treatment plan, medications, and discharge instructions  Description: Complete learning assessment and assess knowledge base.  Interventions:  - Provide teaching at level of understanding  - Provide teaching via preferred learning methods  Outcome: Progressing

## 2025-06-12 NOTE — ED NOTES
Pt O2 saturation at 88%. Pt placed on 1L nasal cannula. O2 now at 94%.      Narda Inman RN  06/11/25 [id number]

## 2025-06-12 NOTE — ASSESSMENT & PLAN NOTE
-Syncopal episode lasting several mins with +/- LOC  -Patient does not recall the episode.  States he was on the phone prior.  -EKG: Normal sinus nonischemic.  -CXR: No acute cardiopulmonary abnormality.  -Head CT: No acute intracranial abnormality.  -Labs remarkable for ethanol 535.  Potassium 3.4.  Anion gap 16.  Elevated liver enzymes.  -Last echocardiogram: Not in records  Plan:  >Admit to medicine for workup for syncope. Monitor on telemetry for arrhythmia. Order 2D echocardiogram as well as U/S carotids. Check orthostatics.   >Neuro checks q4h  >IVF  >Consider Cardiology and/or Neurology consultation in AM   >Tylenol prn pain, tigan prn nausea   >Check tsh  >Daily cbc, bmp

## 2025-06-12 NOTE — PLAN OF CARE
Problem: PAIN - ADULT  Goal: Verbalizes/displays adequate comfort level or baseline comfort level  Description: Interventions:  - Encourage patient to monitor pain and request assistance  - Assess pain using appropriate pain scale  - Administer analgesics as ordered based on type and severity of pain and evaluate response  - Implement non-pharmacological measures as appropriate and evaluate response  - Consider cultural and social influences on pain and pain management  - Notify physician/advanced practitioner if interventions unsuccessful or patient reports new pain  - Educate patient/family on pain management process including their role and importance of  reporting pain   - Provide non-pharmacologic/complimentary pain relief interventions  Outcome: Progressing     Problem: INFECTION - ADULT  Goal: Absence or prevention of progression during hospitalization  Description: INTERVENTIONS:  - Assess and monitor for signs and symptoms of infection  - Monitor lab/diagnostic results  - Monitor all insertion sites, i.e. indwelling lines, tubes, and drains  - Monitor endotracheal if appropriate and nasal secretions for changes in amount and color  - [place] appropriate cooling/warming therapies per order  - Administer medications as ordered  - Instruct and encourage patient and family to use good hand hygiene technique  - Identify and instruct in appropriate isolation precautions for identified infection/condition  Outcome: Progressing  Goal: Absence of fever/infection during neutropenic period  Description: INTERVENTIONS:  - Monitor WBC  - Perform strict hand hygiene  - Limit to healthy visitors only  - No plants, dried, fresh or silk flowers with cohen in patient room  Outcome: Progressing     Problem: SAFETY ADULT  Goal: Patient will remain free of falls  Description: INTERVENTIONS:  - Educate patient/family on patient safety including physical limitations  - Instruct patient to call for assistance with activity   -  Consider consulting OT/PT to assist with strengthening/mobility based on AM PAC & JH-HLM score  - Consult OT/PT to assist with strengthening/mobility   - Keep Call bell within reach  - Keep bed low and locked with side rails adjusted as appropriate  - Keep care items and personal belongings within reach  - Initiate and maintain comfort rounds  - Make Fall Risk Sign visible to staff  - Offer Toileting every  Hours, in advance of need  - Initiate/Maintain alarm  - Obtain necessary fall risk management equipment:   - Apply yellow socks and bracelet for high fall risk patients  - Consider moving patient to room near nurses station  Outcome: Progressing  Goal: Maintain or return to baseline ADL function  Description: INTERVENTIONS:  -  Assess patient's ability to carry out ADLs; assess patient's baseline for ADL function and identify physical deficits which impact ability to perform ADLs (bathing, care of mouth/teeth, toileting, grooming, dressing, etc.)  - Assess/evaluate cause of self-care deficits   - Assess range of motion  - Assess patient's mobility; develop plan if impaired  - Assess patient's need for assistive devices and provide as appropriate  - Encourage maximum independence but intervene and supervise when necessary  - Involve family in performance of ADLs  - Assess for home care needs following discharge   - Consider OT consult to assist with ADL evaluation and planning for discharge  - Provide patient education as appropriate  - Monitor functional capacity and physical performance, use of AM PAC & JH-HLM   - Monitor gait, balance and fatigue with ambulation    Outcome: Progressing  Goal: Maintains/Returns to pre admission functional level  Description: INTERVENTIONS:  - Perform AM-PAC 6 Click Basic Mobility/ Daily Activity assessment daily.  - Set and communicate daily mobility goal to care team and patient/family/caregiver.   - Collaborate with rehabilitation services on mobility goals if consulted  -  Perform Range of Motion  times a day.  - Reposition patient every  hours.  - Dangle patient  times a day  - Stand patient  times a day  - Ambulate patient  times a day  - Out of bed to chair  times a day   - Out of bed for meals  times a day  - Out of bed for toileting  - Record patient progress and toleration of activity level   Outcome: Progressing     Problem: DISCHARGE PLANNING  Goal: Discharge to home or other facility with appropriate resources  Description: INTERVENTIONS:  - Identify barriers to discharge w/patient and caregiver  - Arrange for needed discharge resources and transportation as appropriate  - Identify discharge learning needs (meds, wound care, etc.)  - Arrange for interpretive services to assist at discharge as needed  - Refer to Case Management Department for coordinating discharge planning if the patient needs post-hospital services based on physician/advanced practitioner order or complex needs related to functional status, cognitive ability, or social support system  Outcome: Progressing     Problem: Knowledge Deficit  Goal: Patient/family/caregiver demonstrates understanding of disease process, treatment plan, medications, and discharge instructions  Description: Complete learning assessment and assess knowledge base.  Interventions:  - Provide teaching at level of understanding  - Provide teaching via preferred learning methods  Outcome: Progressing

## 2025-06-12 NOTE — ASSESSMENT & PLAN NOTE
Continue daily vitamin supplementation with thiamine, folic acid, and multivitamin  Appreciate certified  and case management consultations for recommendations regarding aftercare resources, recovery support and disposition planning  Patient is contemplating naltrexone.  We discussed this and he is considering it.  He denies any opioid, kratom, tramadol use.  Please see below for dosing information if he decides that he would like to pursue it:  Patient does have a transaminitis, would check LFTs later today to ensure that they are coming down.  I have placed an order for hepatic function panel.  As long as the LFTs are coming down it would be okay to start naltrexone  First dose is 25 mg on day 1  Day 2 is the maintenance dosing of 50 mg

## 2025-06-13 ENCOUNTER — APPOINTMENT (INPATIENT)
Dept: NON INVASIVE DIAGNOSTICS | Facility: HOSPITAL | Age: 45
DRG: 897 | End: 2025-06-13

## 2025-06-13 LAB
AORTIC ROOT: 3.8 CM
ASCENDING AORTA: 3.5 CM
BSA FOR ECHO PROCEDURE: 2.13 M2
FRACTIONAL SHORTENING: 23 (ref 28–44)
INTERVENTRICULAR SEPTUM IN DIASTOLE (PARASTERNAL SHORT AXIS VIEW): 0.8 CM
INTERVENTRICULAR SEPTUM: 0.8 CM (ref 0.6–1.1)
LAAS-AP2: 18 CM2
LAAS-AP4: 15.3 CM2
LEFT ATRIUM SIZE: 3.4 CM
LEFT ATRIUM VOLUME (MOD BIPLANE): 51 ML
LEFT ATRIUM VOLUME INDEX (MOD BIPLANE): 23.9 ML/M2
LEFT INTERNAL DIMENSION IN SYSTOLE: 4.6 CM (ref 2.1–4)
LEFT VENTRICLE DIASTOLIC VOLUME (MOD BIPLANE): 191 ML
LEFT VENTRICLE DIASTOLIC VOLUME INDEX (MOD BIPLANE): 89.7 ML/M2
LEFT VENTRICLE SYSTOLIC VOLUME (MOD BIPLANE): 119 ML
LEFT VENTRICLE SYSTOLIC VOLUME INDEX (MOD BIPLANE): 55.9 ML/M2
LEFT VENTRICULAR INTERNAL DIMENSION IN DIASTOLE: 6 CM (ref 3.5–6)
LEFT VENTRICULAR POSTERIOR WALL IN END DIASTOLE: 0.8 CM
LEFT VENTRICULAR STROKE VOLUME: 83 ML
LVSV (TEICH): 83 ML
RIGHT ATRIUM AREA SYSTOLE A4C: 13.5 CM2
RIGHT VENTRICLE ID DIMENSION: 3.5 CM
SL CV LEFT ATRIUM LENGTH A2C: 5.1 CM
SL CV LV EF: 40
SL CV PED ECHO LEFT VENTRICLE DIASTOLIC VOLUME (MOD BIPLANE) 2D: 183 ML
SL CV PED ECHO LEFT VENTRICLE SYSTOLIC VOLUME (MOD BIPLANE) 2D: 100 ML
TRICUSPID ANNULAR PLANE SYSTOLIC EXCURSION: 1.7 CM

## 2025-06-13 PROCEDURE — 99232 SBSQ HOSP IP/OBS MODERATE 35: CPT | Performed by: INTERNAL MEDICINE

## 2025-06-13 PROCEDURE — 93306 TTE W/DOPPLER COMPLETE: CPT

## 2025-06-13 PROCEDURE — 93306 TTE W/DOPPLER COMPLETE: CPT | Performed by: INTERNAL MEDICINE

## 2025-06-13 RX ORDER — NALTREXONE HYDROCHLORIDE 50 MG/1
50 TABLET, FILM COATED ORAL DAILY
Status: DISCONTINUED | OUTPATIENT
Start: 2025-06-14 | End: 2025-06-15 | Stop reason: HOSPADM

## 2025-06-13 RX ORDER — NALTREXONE HYDROCHLORIDE 50 MG/1
25 TABLET, FILM COATED ORAL ONCE
Status: COMPLETED | OUTPATIENT
Start: 2025-06-13 | End: 2025-06-13

## 2025-06-13 RX ADMIN — Medication 1 TABLET: at 09:07

## 2025-06-13 RX ADMIN — DIAZEPAM 10 MG: 10 INJECTION, SOLUTION INTRAMUSCULAR; INTRAVENOUS at 16:17

## 2025-06-13 RX ADMIN — NICOTINE 1 PATCH: 21 PATCH, EXTENDED RELEASE TRANSDERMAL at 09:13

## 2025-06-13 RX ADMIN — FOLIC ACID 1 MG: 1 TABLET ORAL at 09:07

## 2025-06-13 RX ADMIN — NALTREXONE HYDROCHLORIDE 25 MG: 50 TABLET, FILM COATED ORAL at 11:10

## 2025-06-13 RX ADMIN — NICOTINE POLACRILEX 2 MG: 2 GUM, CHEWING BUCCAL at 14:55

## 2025-06-13 RX ADMIN — ENOXAPARIN SODIUM 40 MG: 40 INJECTION SUBCUTANEOUS at 09:07

## 2025-06-13 RX ADMIN — AMOXICILLIN 500 MG: 250 CAPSULE ORAL at 21:08

## 2025-06-13 RX ADMIN — DIAZEPAM 10 MG: 5 TABLET ORAL at 07:43

## 2025-06-13 RX ADMIN — PANTOPRAZOLE SODIUM 40 MG: 40 TABLET, DELAYED RELEASE ORAL at 05:24

## 2025-06-13 RX ADMIN — AMOXICILLIN 500 MG: 250 CAPSULE ORAL at 05:24

## 2025-06-13 RX ADMIN — AMOXICILLIN 500 MG: 250 CAPSULE ORAL at 14:55

## 2025-06-13 RX ADMIN — CHLORDIAZEPOXIDE HYDROCHLORIDE 25 MG: 25 CAPSULE ORAL at 01:51

## 2025-06-13 RX ADMIN — DIAZEPAM 10 MG: 5 TABLET ORAL at 23:21

## 2025-06-13 RX ADMIN — CHLORDIAZEPOXIDE HYDROCHLORIDE 25 MG: 25 CAPSULE ORAL at 09:07

## 2025-06-13 RX ADMIN — THIAMINE HCL TAB 100 MG 100 MG: 100 TAB at 09:07

## 2025-06-13 RX ADMIN — DIAZEPAM 10 MG: 5 TABLET ORAL at 03:03

## 2025-06-13 RX ADMIN — MECLIZINE HYDROCHLORIDE 25 MG: 25 TABLET ORAL at 23:21

## 2025-06-13 RX ADMIN — DIAZEPAM 10 MG: 5 TABLET ORAL at 13:02

## 2025-06-13 NOTE — PLAN OF CARE
Problem: PAIN - ADULT  Goal: Verbalizes/displays adequate comfort level or baseline comfort level  Description: Interventions:  - Encourage patient to monitor pain and request assistance  - Assess pain using appropriate pain scale  - Administer analgesics as ordered based on type and severity of pain and evaluate response  - Implement non-pharmacological measures as appropriate and evaluate response  - Consider cultural and social influences on pain and pain management  - Notify physician/advanced practitioner if interventions unsuccessful or patient reports new pain  - Educate patient/family on pain management process including their role and importance of  reporting pain   - Provide non-pharmacologic/complimentary pain relief interventions  Outcome: Progressing     Problem: INFECTION - ADULT  Goal: Absence or prevention of progression during hospitalization  Description: INTERVENTIONS:  - Assess and monitor for signs and symptoms of infection  - Monitor lab/diagnostic results  - Monitor all insertion sites, i.e. indwelling lines, tubes, and drains  - Monitor endotracheal if appropriate and nasal secretions for changes in amount and color  - [place] appropriate cooling/warming therapies per order  - Administer medications as ordered  - Instruct and encourage patient and family to use good hand hygiene technique  - Identify and instruct in appropriate isolation precautions for identified infection/condition  Outcome: Progressing  Goal: Absence of fever/infection during neutropenic period  Description: INTERVENTIONS:  - Monitor WBC  - Perform strict hand hygiene  - Limit to healthy visitors only  - No plants, dried, fresh or silk flowers with cohen in patient room  Outcome: Progressing     Problem: SAFETY ADULT  Goal: Patient will remain free of falls  Description: INTERVENTIONS:  - Educate patient/family on patient safety including physical limitations  - Instruct patient to call for assistance with activity   -  Consider consulting OT/PT to assist with strengthening/mobility based on AM PAC & JH-HLM score  - Consult OT/PT to assist with strengthening/mobility   - Keep Call bell within reach  - Keep bed low and locked with side rails adjusted as appropriate  - Keep care items and personal belongings within reach  - Initiate and maintain comfort rounds  - Make Fall Risk Sign visible to staff  - Offer Toileting every 2 Hours, in advance of need  - Initiate/Maintain bed alarm  - Obtain necessary fall risk management equipment: bed alarm  - Apply yellow socks and bracelet for high fall risk patients  - Consider moving patient to room near nurses station  Outcome: Progressing  Goal: Maintain or return to baseline ADL function  Description: INTERVENTIONS:  -  Assess patient's ability to carry out ADLs; assess patient's baseline for ADL function and identify physical deficits which impact ability to perform ADLs (bathing, care of mouth/teeth, toileting, grooming, dressing, etc.)  - Assess/evaluate cause of self-care deficits   - Assess range of motion  - Assess patient's mobility; develop plan if impaired  - Assess patient's need for assistive devices and provide as appropriate  - Encourage maximum independence but intervene and supervise when necessary  - Involve family in performance of ADLs  - Assess for home care needs following discharge   - Consider OT consult to assist with ADL evaluation and planning for discharge  - Provide patient education as appropriate  - Monitor functional capacity and physical performance, use of AM PAC & JH-HLM   - Monitor gait, balance and fatigue with ambulation    Outcome: Progressing     Problem: DISCHARGE PLANNING  Goal: Discharge to home or other facility with appropriate resources  Description: INTERVENTIONS:  - Identify barriers to discharge w/patient and caregiver  - Arrange for needed discharge resources and transportation as appropriate  - Identify discharge learning needs (meds, wound  care, etc.)  - Arrange for interpretive services to assist at discharge as needed  - Refer to Case Management Department for coordinating discharge planning if the patient needs post-hospital services based on physician/advanced practitioner order or complex needs related to functional status, cognitive ability, or social support system  Outcome: Progressing     Problem: Knowledge Deficit  Goal: Patient/family/caregiver demonstrates understanding of disease process, treatment plan, medications, and discharge instructions  Description: Complete learning assessment and assess knowledge base.  Interventions:  - Provide teaching at level of understanding  - Provide teaching via preferred learning methods  Outcome: Progressing

## 2025-06-13 NOTE — UTILIZATION REVIEW
Initial Clinical Review    Admission: Date/Time/Statement:   Admission Orders (From admission, onward)       Ordered        06/12/25 0044  INPATIENT ADMISSION  Once                          Orders Placed This Encounter   Procedures    INPATIENT ADMISSION     Standing Status:   Standing     Number of Occurrences:   1     Level of Care:   Med Surg [16]     Estimated length of stay:   More than 2 Midnights     Certification:   I certify that inpatient services are medically necessary for this patient for a duration of greater than two midnights. See H&P and MD Progress Notes for additional information about the patient's course of treatment.     ED Arrival Information       Expected   -    Arrival   6/11/2025 18:57    Acuity   Urgent              Means of arrival   Walk-In    Escorted by   Self    Service   Hospitalist    Admission type   Emergency              Arrival complaint   Intoxication             Chief Complaint   Patient presents with    Alcohol Intoxication     Pt arrives from home with wife requesting detox from alcohol.        Initial Presentation: 44 y.o. male with a PMH of alcohol abuse disorder, tobacco abuse, who presented from home to [facility] ED. Admitted as Inpatient for evaluation and treatment of Alcoholic Ketoacidosis. Syncope    Presented  seeking alcohol detoxification. Per wife, pt had a syncopal episode earlier this evening, he was on the phone and she saw him laying back and turning pale. Reports he had several episodes like this in the past few wks. He has been intermittently confused over this period of time. Pt reports he has been drinking heavily over 20 yrs, he drinks a half handle of vodka daily and an unspecified amt of beer w/ this. Pt reports he has been trying to cut back on his alcohol use however noticed that he begins experiencing withdrawal symptoms earlier like tremors and anxiety within 12-14 hrs from his last drink. He reports he last drank at 7pm this evening,  6/11/25. On exam, no focal deficits present. CIWA score 0. Abnormal Labs K 3.4, Ethanol level 535, , ALT 92, Mg 1.8.    Plan: Admit to stepdown, Tele monitor,  Echo, US Carotids, Check orthostatics, Continuous cardiac monitoring and pulse oximetry, Neuro checks every 4 hrs, Monitor for alcohol withdrawals and seizures, Initiate IV Fluids, thiamine, folic acid, MVI. Start Librium.  Consider Cardiology and/or Neurology consults. Daily CBC and BMP. Check TSH. Continue PTA Amoxicillin following recent dental procedure. Replete K and Mg. F/U labs in am. Toxicology and  consulted.    Toxicology: Alcohol use disorder, alcohol withdrawal. Plan: Tele, continuous pulse ox, Repeat LFTs to ensure they are coming down, Hepatic function panel, once LFTs are coming down start Naltrexone- first dose 25mg then maintenance dosing of 50mg. For phenobarbital dosing, can use: 130 mg IV for mild to moderate symptoms and phenobarbital 260 mg IV for moderate to severe symptoms. Titrate to RASS -1 and hold for significant sedation. Maximum total dose of phenobarbital is 2 grams. If patient is approaching 2 grams of phenobarbital with continued withdrawal symptoms, escalate w/ use of dexmedetomidine or ketamine gtt.     Anticipated Length of Stay/Certification Statement: Patient will be admitted on an inpatient basis with an anticipated length of stay of greater than 2 midnights secondary to alcohol detoxification.     Date: 6/13/25   Day 2:   Pt reports extremity shakes have improved and denied any numbness or tingling sensations on his extremities or torso. CIWA score 10. On exam, pt tachycardic. Pt is s/p IV Fluids w/ resolution of elevated anion gap. Tolerating PO, IV fluids discontinued. Plan: Continue to monitor for withdrawal, Continue Valium for withdrawals, DC Librium today per toxicology recs, Continjue Naltrexone, F/U on Echo, DC Tele today, Pt is recommended for outpatient alcohol rehab at discharge.     Certification  Statement: The patient will continue to require additional inpatient hospital stay due to acute alcohol withdrawal     ED Treatment-Medication Administration from 06/11/2025 1857 to 06/12/2025 0108       None            Scheduled Medications:  amoxicillin, 500 mg, Oral, Q8H REECE  enoxaparin, 40 mg, Subcutaneous, Daily  folic acid, 1 mg, Oral, Daily  multivitamin-minerals, 1 tablet, Oral, Daily  nicotine, 1 patch, Transdermal, Daily  pantoprazole, 40 mg, Oral, Early Morning  thiamine, 100 mg, Oral, Daily    chlordiazePOXIDE (LIBRIUM) capsule 25 mg  Dose: 25 mg  Freq: Every 8 hours scheduled Route: PO  Start: 06/12/25 0830 End: 06/12/25 1327    LORazepam (ATIVAN) tablet 2 mg  Dose: 2 mg  Freq: Once Route: PO  Indications of Use: ALCOHOL WITHDRAWAL SYNDROME  Start: 06/12/25 1345 End: 06/12/25 1342  LORazepam (ATIVAN) tablet 2 mg  Dose: 2 mg  Freq: Once Route: PO  Indications of Use: ALCOHOL WITHDRAWAL SYNDROME  Start: 06/12/25 1200 End: 06/12/25 1210     magnesium sulfate 2 g/50 mL IVPB (premix) 2 g  Dose: 2 g  Freq: Once Route: IV  Last Dose: 2 g (06/12/25 0302)  Start: 06/12/25 0115 End: 06/12/25 0502  potassium chloride (Klor-Con M20) CR tablet 20 mEq  Dose: 20 mEq  Freq: Once Route: PO  Start: 06/12/25 0130 End: 06/12/25 0302  chlordiazePOXIDE (LIBRIUM) capsule 25 mg  Dose: 25 mg  Freq: Every 6 hours Route: PO  Start: 06/12/25 1430 End: 06/13/25 1210  naltrexone (REVIA) tablet 25 mg  Dose: 25 mg  Freq: Once Route: PO  Start: 06/13/25 1000 End: 06/13/25 1110      Continuous IV Infusions:  multi-electrolyte (Plasmalyte-A/Isolyte-S PH 7.4/Normosol-R) IV solution  Rate: 100 mL/hr Dose: 100 mL/hr  Freq: Continuous Route: IV  Last Dose: Stopped (06/13/25 0524)  Start: 06/12/25 0115 End: 06/13/25 0526       PRN Meds:  acetaminophen, 650 mg, Oral, Q4H PRN- given x1 6/12 @ 0627  aluminum-magnesium hydroxide-simethicone, 30 mL, Oral, Q6H PRN  diazepam, 10 mg, Intravenous, Q1H PRN- given x1 6/12 @ 1805, x1 6/13 @  1617  diazepam, 10 mg, Oral, Q4H PRN- given x2 6/12 @ 1852, 2302, x3 6/13 @ 0303, 0743, 1302  meclizine, 25 mg, Oral, TID PRN- given x1 6/12 @ 0630  trimethobenzamide, 200 mg, Intramuscular, Q6H PRN  nicotine polacrilex (NICORETTE) gum 2 mg  Dose: 2 mg  Freq: Every 2 hour PRN Route: PO  PRN Reason: nicotine cravings  Start: 06/13/25 1306- given x1 6/13 @ 1455    ED Triage Vitals   Temperature Pulse Respirations Blood Pressure SpO2 Pain Score   06/11/25 1903 06/11/25 1903 06/11/25 1903 06/11/25 1903 06/11/25 1903 06/11/25 1935   98.2 °F (36.8 °C) 94 20 128/89 94 % 1     Weight (last 2 days)       Date/Time Weight    06/13/25 1130 95.3 (210)    06/12/25 0115 95.4 (210.4)    06/11/25 1935 90.7 (200)            Vital Signs (last 3 days)       Date/Time Temp Pulse Resp BP MAP (mmHg) SpO2 Calculated FIO2 (%) - Nasal Cannula Nasal Cannula O2 Flow Rate (L/min) O2 Device Patient Position - Orthostatic VS Lalo Coma Scale Score CIWA-Ar Total Pain    06/13/25 1526 98.2 °F (36.8 °C) 82 20 110/84 -- 96 % -- -- None (Room air) Lying -- 10 --    06/13/25 1255 -- 106 -- 127/84 -- -- -- -- -- -- -- 7 --    06/13/25 1200 -- -- -- -- -- -- -- -- -- -- 15 -- --    06/13/25 1130 -- 97 -- 128/85 -- -- -- -- -- -- -- -- --    06/13/25 1046 -- 97 18 128/85 -- 96 % -- -- None (Room air) Lying -- -- No Pain    06/13/25 0903 98 °F (36.7 °C) 94 18 119/85 -- 96 % -- -- None (Room air) Lying -- 2 No Pain    06/13/25 0800 -- -- -- -- -- -- -- -- -- -- 15 -- No Pain    06/13/25 0700 98.4 °F (36.9 °C) 78 16 118/77 92 98 % -- -- None (Room air) Lying 15 2 No Pain    06/13/25 0255 98.2 °F (36.8 °C) 84 18 125/91 104 97 % -- -- None (Room air) Lying 15 2 --    06/12/25 2252 97.9 °F (36.6 °C) 75 16 134/93 107 100 % 24 1 L/min Nasal cannula Lying 15 2 No Pain    06/12/25 1930 -- -- -- -- -- -- -- -- -- -- -- -- No Pain    06/12/25 1900 -- -- -- -- -- -- -- -- -- -- 15 -- --    06/12/25 1845 98.5 °F (36.9 °C) 87 18 128/91 -- 100 % 28 2 L/min -- Lying --  -- --    06/12/25 1841 -- -- -- -- -- -- -- -- -- -- -- 7 --    06/12/25 1748 -- -- -- -- -- -- -- -- -- -- -- 12 --    06/12/25 1600 -- -- -- -- -- -- -- -- -- -- 15 -- --    06/12/25 1446 98.1 °F (36.7 °C) 78 18 125/80 -- 98 % 28 2 L/min -- Lying -- 6 --    06/12/25 1311 98.1 °F (36.7 °C) 81 18 130/93 -- 99 % -- -- -- Lying -- 10 --    06/12/25 1200 -- -- -- -- -- -- -- -- -- -- 15 -- --    06/12/25 1151 -- -- -- -- -- -- -- -- -- -- -- 9 --    06/12/25 0900 -- -- -- -- -- -- -- -- -- -- -- 2 --    06/12/25 0814 98.1 °F (36.7 °C) 82 13 125/69 -- 96 % -- -- None (Room air) Lying -- -- --    06/12/25 0750 -- -- -- -- -- -- -- -- -- -- 15 -- No Pain    06/12/25 0627 -- -- -- -- -- -- -- -- -- -- -- -- 5    06/12/25 0500 98.4 °F (36.9 °C) 68 16 131/83 101 97 % -- -- None (Room air) Lying -- 5 No Pain    06/12/25 0123 -- -- -- -- -- 97 % -- -- None (Room air) -- -- -- --    06/12/25 0115 98.5 °F (36.9 °C) 82 20 116/74 82 96 % -- -- None (Room air) Lying 15 5 5    06/12/25 0030 -- 87 20 128/62 89 97 % -- -- None (Room air) Lying -- 1 --    06/12/25 0000 -- 95 20 119/83 96 95 % -- -- None (Room air) Lying -- -- --    06/11/25 2330 -- 74 16 120/87 97 94 % -- -- None (Room air) Lying -- 1 --    06/11/25 2300 -- 87 16 115/70 85 95 % -- -- None (Room air) Lying -- -- --    06/11/25 2230 -- 84 16 102/60 77 95 % -- -- None (Room air) Lying -- 5 --    06/11/25 2200 -- 91 20 110/65 82 94 % 24 1 L/min Nasal cannula -- -- -- --    06/11/25 2130 -- 80 18 118/71 90 93 % -- -- None (Room air) Lying -- 7 --    06/11/25 2100 -- 81 16 118/83 95 92 % -- -- None (Room air) Lying -- -- --    06/11/25 2045 -- 87 16 125/86 102 95 % -- -- None (Room air) Lying -- -- --    06/11/25 2030 -- 86 -- 125/86 -- -- -- -- -- -- -- 8 --    06/11/25 1938 -- -- -- -- -- -- -- -- None (Room air) -- -- -- --    06/11/25 1935 -- -- 16 132/91 -- 94 % -- -- None (Room air) Lying 15 -- 1 06/11/25 1930 -- 91 -- 132/91 -- -- -- -- -- -- -- 11 --    06/11/25  1903 98.2 °F (36.8 °C) 94 20 128/89 -- 94 % -- -- None (Room air) Lying -- -- --           CIWA-Ar Score       Row Name 06/13/25 1526 06/13/25 1255 06/13/25 0903       CIWA-Ar    BP -- 127/84 --    Pulse -- 106 --    Nausea and Vomiting 0 0 0    Tactile Disturbances 0 0 0    Tremor 2 2 2    Auditory Disturbances 0 0 0    Paroxysmal Sweats 0 1 0    Visual Disturbances 0 0 0    Anxiety 4 0 0    Headache, Fullness in Head 0 0 0    Agitation 4 4 0    Orientation and Clouding of Sensorium 0 0 0    CIWA-Ar Total 10 7 2      Row Name 06/13/25 0700 06/13/25 0255 06/12/25 2252       CIWA-Ar    BP -- 125/91 --    Pulse -- 84 --    Nausea and Vomiting 0 0 0    Tactile Disturbances 0 0 0    Tremor 2 2 1    Auditory Disturbances 0 0 0    Paroxysmal Sweats 0 0 0    Visual Disturbances 0 0 0    Anxiety 0 0 1    Headache, Fullness in Head 0 0 0    Agitation 0 0 0    Orientation and Clouding of Sensorium 0 0 0    CIWA-Ar Total 2 2 2      [facility] Name 06/12/25 1841 06/12/25 1748 06/12/25 1446       CIWA-Ar    Nausea and Vomiting 0 0 0    Tactile Disturbances 0 0 0    Tremor 3 4 2    Auditory Disturbances 0 0 0    Paroxysmal Sweats 1 1 0    Visual Disturbances 0 0 0    Anxiety 3 4 3    Headache, Fullness in Head 0 2 0    Agitation 0 1 1    Orientation and Clouding of Sensorium 0 0 0    CIWA-Ar Total 7 12 6      [facility] Name 06/12/25 1311 06/12/25 1151 06/12/25 0900       CIWA-Ar    Nausea and Vomiting 1 0 0    Tactile Disturbances 0 0 0    Tremor 2 3 1    Auditory Disturbances 0 0 0    Paroxysmal Sweats 1 0 0    Visual Disturbances 0 0 0    Anxiety 4 4 1    Headache, Fullness in Head 1 2 0    Agitation 1 0 0    Orientation and Clouding of Sensorium 0 0 0    CIWA-Ar Total 10 9 2      [facility] Name 06/12/25 0814 06/12/25 0500 06/12/25 0115       CIWA-Ar    Nausea and Vomiting -- 0 0    Tactile Disturbances -- 0 0    Tremor -- 0 0    Auditory Disturbances -- 0 0    Paroxysmal Sweats -- 0 0    Visual Disturbances -- 0 0    Anxiety -- 0 0    Headache,  Fullness in Head -- 5 5    Agitation -- 0 0    Orientation and Clouding of Sensorium -- 0 0    CIWA-Ar Total -- 5 5      Row Name 06/12/25 0030 06/11/25 2330 06/11/25 2230       CIWA-Ar    BP -- 120/87 --    Pulse -- 74 --    Nausea and Vomiting 0 0 0    Tactile Disturbances 0 0 0    Tremor 0 0 0    Auditory Disturbances 0 0 0    Paroxysmal Sweats 0 0 0    Visual Disturbances 0 0 0    Anxiety 1 1 2    Headache, Fullness in Head 0 0 3    Agitation 0 0 0    Orientation and Clouding of Sensorium 0 0 0    CIWA-Ar Total 1 1 5      Row Name 06/11/25 2130 06/11/25 2030 06/11/25 1930       CIWA-Ar    BP -- 125/86 132/91    Pulse -- 86 91    Nausea and Vomiting 0 1 2    Tactile Disturbances 1 1 2  denies itching. mild pins and needles and burning    Tremor 0 0 0  pt reports tremors at home    Auditory Disturbances 0 0 0    Paroxysmal Sweats 0 0 0    Visual Disturbances 0 0 0    Anxiety 3 3 5    Headache, Fullness in Head 3 3 2    Agitation 0 0 0    Orientation and Clouding of Sensorium 0 0 0    CIWA-Ar Total 7 8 11      Row Name 06/11/25 1929             CIWA-Ar    BP --      Pulse --      Nausea and Vomiting --      Tactile Disturbances --      Tremor --      Auditory Disturbances --      Paroxysmal Sweats --      Visual Disturbances --      Anxiety --      Headache, Fullness in Head --      Agitation --      Orientation and Clouding of Sensorium --      CIWA-Ar Total --                      Pertinent Labs/Diagnostic Test Results:   Radiology:  VAS carotid complete study   Final Interpretation by Bradley Stone MD (06/12 1044)      XR chest 1 view portable   ED Interpretation by Simeon Eli MD (06/11 2114)   NAD      Final Interpretation by Nabeel Blake MD (06/11 2200)      No acute cardiopulmonary disease.            Workstation performed: [id number]         CT head without contrast   Final Interpretation by Noam Silva MD (06/11 2014)      No acute intracranial abnormality.                   Workstation performed: [id number]           Cardiology:  Echo complete w/ contrast if indicated   Final Result by Mat Vaughan MD (06/13 1419)        Left Ventricle: Left ventricular cavity size is mildly dilated. Wall    thickness is normal. The left ventricular ejection fraction is 40% by    biplane measurement. Systolic function is moderately reduced. There is    global hypokinesis. Unable to assess diastolic function due to E/A fusion.     Right Ventricle: Right ventricular cavity size is normal. Systolic    function is low normal.     No prior study available for comparison.         ECG 12 lead   Final Result by Mat Vaughan MD (06/12 0919)   Normal sinus rhythm   Poor R wave progression   Abnormal ECG   When compared with ECG of 07-Apr-2022 11:30,   No significant change was found   Confirmed by Mat Vaughan ([id number]) on 6/12/2025 9:19:31 AM        Results from last 7 days   Lab Units 06/12/25  0542 06/11/25 1947   WBC Thousand/uL 3.97* 4.21*   HEMOGLOBIN g/dL 14.5 14.7   HEMATOCRIT % 42.6 43.2   PLATELETS Thousands/uL 192 194   TOTAL NEUT ABS Thousands/µL 2.02 1.75*         Results from last 7 days   Lab Units 06/12/25  0542 06/11/25  1947   SODIUM mmol/L 141 142   POTASSIUM mmol/L 3.6 3.4*   CHLORIDE mmol/L 99 99   CO2 mmol/L 30 27   ANION GAP mmol/L 12 16*   BUN mg/dL 8 8   CREATININE mg/dL 0.78 0.73   EGFR ml/min/1.73sq m 111 114   CALCIUM mg/dL 8.8 9.0   MAGNESIUM mg/dL 2.0 1.8*   PHOSPHORUS mg/dL 4.4 4.5     Results from last 7 days   Lab Units 06/12/25  1526 06/12/25  0542 06/11/25  1947   AST U/L 164* 178* 194*   ALT U/L 86* 90* 92*   ALK PHOS U/L 49 48 50   TOTAL PROTEIN g/dL 7.4 7.5 7.9   ALBUMIN g/dL 4.5 4.8 5.0   TOTAL BILIRUBIN mg/dL 0.95 0.67 0.75   BILIRUBIN DIRECT mg/dL 0.31*  --   --    AMMONIA umol/L  --   --  38     Results from last 7 days   Lab Units 06/11/25  1905   POC GLUCOSE mg/dl 120     Results from last 7 days   Lab Units 06/12/25  0542 06/11/25  1947   GLUCOSE RANDOM  mg/dL 95 98     Results from last 7 days   Lab Units 06/11/25 1947   PROTIME seconds 12.4   INR  0.89   PTT seconds 27     Results from last 7 days   Lab Units 06/12/25  0542   TSH 3RD GENERATON uIU/mL 6.177*     Results from last 7 days   Lab Units 06/11/25 1947   LIPASE u/L 52     Results from last 7 days   Lab Units 06/11/25 2013   AMPH/METH  Negative   BARBITURATE UR  Negative   BENZODIAZEPINE UR  Negative   COCAINE UR  Negative   METHADONE URINE  Negative   OPIATE UR  Negative   PCP UR  Negative   THC UR  Negative     Results from last 7 days   Lab Units 06/11/25 1947   ETHANOL LVL mg/dL 535*   ACETAMINOPHEN LVL ug/mL <2*   SALICYLATE LVL mg/dL <5*       Past Medical History[1]  Present on Admission:   Alcohol use disorder, severe, dependence (HCC)   Syncope   Alcoholic ketoacidosis   Dental infection      Admitting Diagnosis: Alcohol abuse [F10.10]  Acute alcohol intoxication (HCC) [F10.929]  Age/Sex: 44 y.o. male    Network Utilization Review Department  ATTENTION: Please call with any questions or concerns to [phone] and carefully listen to the prompts so that you are directed to the right person. All voicemails are confidential.   For Discharge needs, contact Care Management DC Support Team at [phone] opt. 2  Send all requests for admission clinical reviews, approved or denied determinations and any other requests to dedicated fax number below belonging to the campus where the patient is receiving treatment. List of dedicated fax numbers for the Facilities:  FACILITY NAME UR FAX NUMBER   ADMISSION DENIALS (Administrative/Medical Necessity) [phone]   DISCHARGE SUPPORT TEAM (NETWORK) [phone]   PARENT CHILD HEALTH (Maternity/NICU/Pediatrics) [phone]   [facility] [phone]   [facility] [phone]   [facility] [phone]   [facility] [phone]   [facility]  [facility] [phone]   [facility] [phone]   [facility] [phone]   [facility] [phone]   [facility] [phone]   [facility] [phone]   [facility] [phone]   [facility] [phone]              [1]   Past Medical History:  Diagnosis Date    Tremor

## 2025-06-13 NOTE — PLAN OF CARE
Problem: PAIN - ADULT  Goal: Verbalizes/displays adequate comfort level or baseline comfort level  Description: Interventions:  - Encourage patient to monitor pain and request assistance  - Assess pain using appropriate pain scale  - Administer analgesics as ordered based on type and severity of pain and evaluate response  - Implement non-pharmacological measures as appropriate and evaluate response  - Consider cultural and social influences on pain and pain management  - Notify physician/advanced practitioner if interventions unsuccessful or patient reports new pain  - Educate patient/family on pain management process including their role and importance of  reporting pain   - Provide non-pharmacologic/complimentary pain relief interventions  6/13/2025 1054 by Anastasia Thompson, RN  Outcome: Progressing  6/13/2025 1054 by Anastasia Thompson, RN  Outcome: Progressing     Problem: DISCHARGE PLANNING  Goal: Discharge to home or other facility with appropriate resources  Description: INTERVENTIONS:  - Identify barriers to discharge w/patient and caregiver  - Arrange for needed discharge resources and transportation as appropriate  - Identify discharge learning needs (meds, wound care, etc.)  - Arrange for interpretive services to assist at discharge as needed  - Refer to Case Management Department for coordinating discharge planning if the patient needs post-hospital services based on physician/advanced practitioner order or complex needs related to functional status, cognitive ability, or social support system  6/13/2025 1054 by Anastasia Thompson, RN  Outcome: Progressing  6/13/2025 1054 by Anastasia Thompson, RN  Outcome: Progressing     Problem: Knowledge Deficit  Goal: Patient/family/caregiver demonstrates understanding of disease process, treatment plan, medications, and discharge instructions  Description: Complete learning assessment and assess knowledge base.  Interventions:  - Provide teaching at level of understanding  -  Provide teaching via preferred learning methods  6/13/2025 1054 by Anastasia Thompson RN  Outcome: Progressing  6/13/2025 1054 by Anastasia Thompson, RN  Outcome: Progressing

## 2025-06-13 NOTE — QUICK NOTE
Toxicology Update    Patient doing well. Symptoms improved. Having low CIWA scoring.      Updated Plan:    Would consider stopping scheduled librium and monitoring clinically for objective findings of withdrawal.    LFTs are downtrending. Received naltrexone 25mg today. Would start 50mg daily tomorrow.      Reach out with any additional questions or concerns.    Nabeel Sanches DO  [facility] Medical Toxicology Service

## 2025-06-13 NOTE — ASSESSMENT & PLAN NOTE
All workup to date has been unremarkable for any acute cardiac disease  Likely all related to alcohol abuse  Echocardiogram done today, will follow-up on results  Telemetry unremarkable and will be discontinued today  Had mildly elevated TSH but with normal free T4.  Recommend repeat studies outpatient in 4 to 6 week

## 2025-06-13 NOTE — ASSESSMENT & PLAN NOTE
Patient with hx of heavy alcohol use, intoxicated on admission, ethanol level 535, anion gap 16  Reports drinking a half handle of vodka and an unspecified amount of beer daily.  Last drink 7 PM 6/11/2025.  He is status post IV fluids with resolution of elevated anion gap  Tolerating p.o. and off IV fluids now, continue thiamine, folic acid, MVI  On CIWA protocol with Valium  He was evaluated by MICHELA, recommend outpatient follow-up on discharge

## 2025-06-13 NOTE — PROGRESS NOTES
Progress Note - Hospitalist   Name: Balaji Hamlin 44 y.o. male I MRN: [id number]  Unit/Bed#: -01 I Date of Admission: 6/11/2025   Date of Service: 6/13/2025 I Hospital Day: 1    Assessment & Plan  Alcohol use disorder, severe, dependence (HCC)  Alcoholic ketoacidosis  Patient with hx of heavy alcohol use, intoxicated on admission, ethanol level 535, anion gap 16  Reports drinking a half handle of vodka and an unspecified amount of beer daily.  Last drink 7 PM 6/11/2025.  He is status post IV fluids with resolution of elevated anion gap  Tolerating p.o. and off IV fluids now, continue thiamine, folic acid, MVI  On CIWA protocol with Valium  He was evaluated by [facility], recommend outpatient follow-up on discharge  Alcohol withdrawal syndrome (HCC)  CIWA scores have been lower today  Seen by toxicology, recommendations appreciated  He will be continued on Valium for withdrawal symptoms  He was started on scheduled Librium yesterday, will discontinue today per toxicology recommendations  Liver enzymes have been trending down and likely elevated secondary to heavy alcohol abuse  Started on naltrexone 25 mg daily and will continue 50 mg daily from tomorrow.  The patient was agreeable to this  Syncope  All workup to date has been unremarkable for any acute cardiac disease  Likely all related to alcohol abuse  Echocardiogram done today, will follow-up on results  Telemetry unremarkable and will be discontinued today  Had mildly elevated TSH but with normal free T4.  Recommend repeat studies outpatient in 4 to 6 week  Dental infection  Continue amoxicillin for scheduled duration as prescribed outpatient    VTE Pharmacologic Prophylaxis: VTE Score: 4 Moderate Risk (Score 3-4) - Pharmacological DVT Prophylaxis Ordered: enoxaparin (Lovenox).    Mobility:   Basic Mobility Inpatient Raw Score: 22  JH-HLM Goal: 7: Walk 25 feet or more  JH-HLM Achieved: 7: Walk 25 feet or more  JH-HLM Goal NOT achieved. Continue with  multidisciplinary rounding and encourage appropriate mobility to improve upon [facility] goals.    Patient Centered Rounds: I performed bedside rounds with nursing staff today.   Discussions with Specialists or Other Care Team Provider: Nursing,     Education and Discussions with Family / Patient: Updated  (wife) at bedside.    Current Length of Stay: 1 day(s)  Current Patient Status: Inpatient   Certification Statement: The patient will continue to require additional inpatient hospital stay due to acute alcohol withdrawal  Discharge Plan: Anticipate discharge in 24-48 hrs to home.    Code Status: Level 1 - Full Code    Subjective   The patient reports he feels a lot better today and was asking when he could be discharged.  He reports extremity shakes have improved and denied any numbness or tingling sensations on his extremities or torso.    Objective :  Temp:  [97.9 °F (36.6 °C)-98.5 °F (36.9 °C)] 98 °F (36.7 °C)  HR:  [75-97] 97  BP: (118-134)/(77-93) 128/85  Resp:  [16-18] 18  SpO2:  [96 %-100 %] 96 %  O2 Device: None (Room air)  Nasal Cannula O2 Flow Rate (L/min):  [1 L/min-2 L/min] 1 L/min    Body mass index is 30.13 kg/m².     Input and Output Summary (last 24 hours):     Intake/Output Summary (Last 24 hours) at 6/13/2025 1222  Last data filed at 6/13/2025 1001  Gross per 24 hour   Intake 600 ml   Output 1600 ml   Net -1000 ml       Physical Exam  Vitals reviewed.   Constitutional:       General: He is not in acute distress.     Appearance: He is ill-appearing. He is not toxic-appearing or diaphoretic.   HENT:      Head: Atraumatic.      Mouth/Throat:      Mouth: Mucous membranes are moist.     Eyes:      General: No scleral icterus.        Right eye: No discharge.         Left eye: No discharge.       Cardiovascular:      Rate and Rhythm: Regular rhythm. Tachycardia present.   Pulmonary:      Effort: Pulmonary effort is normal.   Abdominal:      General: There is no distension.       Palpations: Abdomen is soft. There is no mass.      Tenderness: There is no abdominal tenderness.     Musculoskeletal:      Cervical back: Neck supple.      Right lower leg: No edema.      Left lower leg: No edema.     Skin:     General: Skin is warm and dry.     Neurological:      Mental Status: He is alert and oriented to person, place, and time.      Cranial Nerves: No cranial nerve deficit.     Psychiatric:         Attention and Perception: He does not perceive auditory or visual hallucinations.         Speech: Speech normal.         Lab Results: I have reviewed the following results:   Results from last 7 days   Lab Units 06/12/25  0542   WBC Thousand/uL 3.97*   HEMOGLOBIN g/dL 14.5   HEMATOCRIT % 42.6   PLATELETS Thousands/uL 192   SEGS PCT % 51   LYMPHO PCT % 32   MONO PCT % 13*   EOS PCT % 3     Results from last 7 days   Lab Units 06/12/25  1526 06/12/25  0542   SODIUM mmol/L  --  141   POTASSIUM mmol/L  --  3.6   CHLORIDE mmol/L  --  99   CO2 mmol/L  --  30   BUN mg/dL  --  8   CREATININE mg/dL  --  0.78   ANION GAP mmol/L  --  12   CALCIUM mg/dL  --  8.8   ALBUMIN g/dL 4.5 4.8   TOTAL BILIRUBIN mg/dL 0.95 0.67   ALK PHOS U/L 49 48   ALT U/L 86* 90*   AST U/L 164* 178*   GLUCOSE RANDOM mg/dL  --  95     Results from last 7 days   Lab Units 06/11/25  1947   INR  0.89     Results from last 7 days   Lab Units 06/11/25  1905   POC GLUCOSE mg/dl 120       Last 24 Hours Medication List:     Current Facility-Administered Medications:     acetaminophen (TYLENOL) tablet 650 mg, Q4H PRN    aluminum-magnesium hydroxide-simethicone (MAALOX) oral suspension 30 mL, Q6H PRN    amoxicillin (AMOXIL) capsule 500 mg, Q8H REECE    diazepam (VALIUM) injection 10 mg, Q1H PRN    diazepam (VALIUM) tablet 10 mg, Q4H PRN    enoxaparin (LOVENOX) subcutaneous injection 40 mg, Daily    folic acid (FOLVITE) tablet 1 mg, Daily    meclizine (ANTIVERT) tablet 25 mg, TID PRN    multivitamin-minerals (CENTRUM) tablet 1 tablet, Daily    [START  ON 6/14/2025] naltrexone (REVIA) tablet 50 mg, Daily    nicotine (NICODERM CQ) 21 mg/24 hr TD 24 hr patch 1 patch, Daily    pantoprazole (PROTONIX) EC tablet 40 mg, Early Morning    thiamine tablet 100 mg, Daily    trimethobenzamide (TIGAN) IM injection 200 mg, Q6H PRN    Administrative Statements   Today, Patient Was Seen By: Gita Webster MD    **Please Note: This note may have been constructed using a voice recognition system.**

## 2025-06-13 NOTE — ASSESSMENT & PLAN NOTE
CIWA scores have been lower today  Seen by toxicology, recommendations appreciated  He will be continued on Valium for withdrawal symptoms  He was started on scheduled Librium yesterday, will discontinue today per toxicology recommendations  Liver enzymes have been trending down and likely elevated secondary to heavy alcohol abuse  Started on naltrexone 25 mg daily and will continue 50 mg daily from tomorrow.  The patient was agreeable to this

## 2025-06-14 PROBLEM — I42.9 CARDIOMYOPATHY (HCC): Status: ACTIVE | Noted: 2025-06-14

## 2025-06-14 LAB
ALBUMIN SERPL BCG-MCNC: 4.7 G/DL (ref 3.5–5)
ALP SERPL-CCNC: 48 U/L (ref 34–104)
ALT SERPL W P-5'-P-CCNC: 99 U/L (ref 7–52)
ANION GAP SERPL CALCULATED.3IONS-SCNC: 14 MMOL/L (ref 4–13)
AST SERPL W P-5'-P-CCNC: 153 U/L (ref 13–39)
BILIRUB SERPL-MCNC: 1.29 MG/DL (ref 0.2–1)
BUN SERPL-MCNC: 9 MG/DL (ref 5–25)
CALCIUM SERPL-MCNC: 9.5 MG/DL (ref 8.4–10.2)
CHLORIDE SERPL-SCNC: 94 MMOL/L (ref 96–108)
CO2 SERPL-SCNC: 26 MMOL/L (ref 21–32)
CREAT SERPL-MCNC: 0.72 MG/DL (ref 0.6–1.3)
ERYTHROCYTE [DISTWIDTH] IN BLOOD BY AUTOMATED COUNT: 12.6 % (ref 11.6–15.1)
GFR SERPL CREATININE-BSD FRML MDRD: 115 ML/MIN/1.73SQ M
GLUCOSE SERPL-MCNC: 77 MG/DL (ref 65–140)
HCT VFR BLD AUTO: 43.6 % (ref 36.5–49.3)
HGB BLD-MCNC: 14.9 G/DL (ref 12–17)
MAGNESIUM SERPL-MCNC: 1.8 MG/DL (ref 1.9–2.7)
MCH RBC QN AUTO: 33 PG (ref 26.8–34.3)
MCHC RBC AUTO-ENTMCNC: 34.2 G/DL (ref 31.4–37.4)
MCV RBC AUTO: 97 FL (ref 82–98)
PLATELET # BLD AUTO: 163 THOUSANDS/UL (ref 149–390)
PMV BLD AUTO: 10 FL (ref 8.9–12.7)
POTASSIUM SERPL-SCNC: 3.5 MMOL/L (ref 3.5–5.3)
PROT SERPL-MCNC: 7.6 G/DL (ref 6.4–8.4)
RBC # BLD AUTO: 4.52 MILLION/UL (ref 3.88–5.62)
SODIUM SERPL-SCNC: 134 MMOL/L (ref 135–147)
WBC # BLD AUTO: 6.16 THOUSAND/UL (ref 4.31–10.16)

## 2025-06-14 PROCEDURE — 85027 COMPLETE CBC AUTOMATED: CPT | Performed by: INTERNAL MEDICINE

## 2025-06-14 PROCEDURE — 99254 IP/OBS CNSLTJ NEW/EST MOD 60: CPT | Performed by: INTERNAL MEDICINE

## 2025-06-14 PROCEDURE — 80053 COMPREHEN METABOLIC PANEL: CPT | Performed by: INTERNAL MEDICINE

## 2025-06-14 PROCEDURE — 99233 SBSQ HOSP IP/OBS HIGH 50: CPT | Performed by: INTERNAL MEDICINE

## 2025-06-14 PROCEDURE — 83735 ASSAY OF MAGNESIUM: CPT | Performed by: INTERNAL MEDICINE

## 2025-06-14 RX ORDER — SPIRONOLACTONE 25 MG/1
12.5 TABLET ORAL DAILY
Status: DISCONTINUED | OUTPATIENT
Start: 2025-06-15 | End: 2025-06-15 | Stop reason: HOSPADM

## 2025-06-14 RX ORDER — LANOLIN ALCOHOL/MO/W.PET/CERES
400 CREAM (GRAM) TOPICAL 2 TIMES DAILY
Status: DISCONTINUED | OUTPATIENT
Start: 2025-06-14 | End: 2025-06-15 | Stop reason: HOSPADM

## 2025-06-14 RX ORDER — SODIUM CHLORIDE 9 MG/ML
100 INJECTION, SOLUTION INTRAVENOUS CONTINUOUS
Status: DISPENSED | OUTPATIENT
Start: 2025-06-14 | End: 2025-06-14

## 2025-06-14 RX ORDER — LISINOPRIL 2.5 MG/1
2.5 TABLET ORAL DAILY
Status: DISCONTINUED | OUTPATIENT
Start: 2025-06-14 | End: 2025-06-15 | Stop reason: HOSPADM

## 2025-06-14 RX ADMIN — Medication 400 MG: at 09:31

## 2025-06-14 RX ADMIN — Medication 12.5 MG: at 14:04

## 2025-06-14 RX ADMIN — AMOXICILLIN 500 MG: 250 CAPSULE ORAL at 21:53

## 2025-06-14 RX ADMIN — PANTOPRAZOLE SODIUM 40 MG: 40 TABLET, DELAYED RELEASE ORAL at 04:45

## 2025-06-14 RX ADMIN — NICOTINE 1 PATCH: 21 PATCH, EXTENDED RELEASE TRANSDERMAL at 09:31

## 2025-06-14 RX ADMIN — ENOXAPARIN SODIUM 40 MG: 40 INJECTION SUBCUTANEOUS at 09:31

## 2025-06-14 RX ADMIN — FOLIC ACID 1 MG: 1 TABLET ORAL at 09:31

## 2025-06-14 RX ADMIN — Medication 12.5 MG: at 21:53

## 2025-06-14 RX ADMIN — Medication 400 MG: at 18:55

## 2025-06-14 RX ADMIN — THIAMINE HCL TAB 100 MG 100 MG: 100 TAB at 09:31

## 2025-06-14 RX ADMIN — Medication 1 TABLET: at 09:31

## 2025-06-14 RX ADMIN — AMOXICILLIN 500 MG: 250 CAPSULE ORAL at 04:45

## 2025-06-14 RX ADMIN — AMOXICILLIN 500 MG: 250 CAPSULE ORAL at 14:03

## 2025-06-14 RX ADMIN — DIAZEPAM 10 MG: 10 INJECTION, SOLUTION INTRAMUSCULAR; INTRAVENOUS at 02:39

## 2025-06-14 RX ADMIN — LISINOPRIL 2.5 MG: 2.5 TABLET ORAL at 14:04

## 2025-06-14 RX ADMIN — NALTREXONE HYDROCHLORIDE 50 MG: 50 TABLET, FILM COATED ORAL at 09:33

## 2025-06-14 RX ADMIN — SODIUM CHLORIDE 100 ML/HR: 0.9 INJECTION, SOLUTION INTRAVENOUS at 09:33

## 2025-06-14 NOTE — ASSESSMENT & PLAN NOTE
Patient with hx of heavy alcohol use, intoxicated on admission, ethanol level 535, anion gap 16  Reports drinking a half handle of vodka and an unspecified amount of beer daily.  Last drink 7 PM 6/11/2025.  He is status post IV fluids with initial resolution of elevated anion gap.  Anion gap again mildly elevated today, IV fluids resumed today and patient encouraged to increase oral intake  Continue thiamine, folic acid, MVI  On CIWA protocol with Valium  He was evaluated by MICHELA, recommend outpatient follow-up on discharge

## 2025-06-14 NOTE — PLAN OF CARE
Problem: PAIN - ADULT  Goal: Verbalizes/displays adequate comfort level or baseline comfort level  Description: Interventions:  - Encourage patient to monitor pain and request assistance  - Assess pain using appropriate pain scale  - Administer analgesics as ordered based on type and severity of pain and evaluate response  - Implement non-pharmacological measures as appropriate and evaluate response  - Consider cultural and social influences on pain and pain management  - Notify physician/advanced practitioner if interventions unsuccessful or patient reports new pain  - Educate patient/family on pain management process including their role and importance of  reporting pain   - Provide non-pharmacologic/complimentary pain relief interventions  Outcome: Progressing     Problem: INFECTION - ADULT  Goal: Absence or prevention of progression during hospitalization  Description: INTERVENTIONS:  - Assess and monitor for signs and symptoms of infection  - Monitor lab/diagnostic results  - Monitor all insertion sites, i.e. indwelling lines, tubes, and drains  - Monitor endotracheal if appropriate and nasal secretions for changes in amount and color  - [place] appropriate cooling/warming therapies per order  - Administer medications as ordered  - Instruct and encourage patient and family to use good hand hygiene technique  - Identify and instruct in appropriate isolation precautions for identified infection/condition  Outcome: Progressing  Goal: Absence of fever/infection during neutropenic period  Description: INTERVENTIONS:  - Monitor WBC  - Perform strict hand hygiene  - Limit to healthy visitors only  - No plants, dried, fresh or silk flowers with cohen in patient room  Outcome: Progressing     Problem: DISCHARGE PLANNING  Goal: Discharge to home or other facility with appropriate resources  Description: INTERVENTIONS:  - Identify barriers to discharge w/patient and caregiver  - Arrange for needed discharge resources  and transportation as appropriate  - Identify discharge learning needs (meds, wound care, etc.)  - Arrange for interpretive services to assist at discharge as needed  - Refer to Case Management Department for coordinating discharge planning if the patient needs post-hospital services based on physician/advanced practitioner order or complex needs related to functional status, cognitive ability, or social support system  Outcome: Progressing     Problem: Knowledge Deficit  Goal: Patient/family/caregiver demonstrates understanding of disease process, treatment plan, medications, and discharge instructions  Description: Complete learning assessment and assess knowledge base.  Interventions:  - Provide teaching at level of understanding  - Provide teaching via preferred learning methods  Outcome: Progressing

## 2025-06-14 NOTE — PROGRESS NOTES
Progress Note - Hospitalist   Name: Balaji Hamlin 44 y.o. male I MRN: [id number]  Unit/Bed#: -01 I Date of Admission: 6/11/2025   Date of Service: 6/14/2025 I Hospital Day: 2    Assessment & Plan  Alcohol use disorder, severe, dependence (HCC)  Alcoholic ketoacidosis  Patient with hx of heavy alcohol use, intoxicated on admission, ethanol level 535, anion gap 16  Reports drinking a half handle of vodka and an unspecified amount of beer daily.  Last drink 7 PM 6/11/2025.  He is status post IV fluids with initial resolution of elevated anion gap.  Anion gap again mildly elevated today, IV fluids resumed today and patient encouraged to increase oral intake  Continue thiamine, folic acid, MVI  On CIWA protocol with Valium  He was evaluated by [facility], recommend outpatient follow-up on discharge  Alcohol withdrawal syndrome (HCC)  CIWA scores have been lower today  Seen by toxicology, recommendations appreciated  On Valium for withdrawal symptoms and has continued to require PRN Valium with last IV dose given overnight at 2:39 AM  Scheduled Librium discontinued yesterday per toxicology recommendations  Liver enzymes trended down and likely elevated secondary to heavy alcohol abuse  Started on naltrexone, currently on 50 mg daily.  The patient was agreeable to this  Syncope  All workup to date has been unremarkable for any acute cardiac disease  Likely all related to alcohol abuse  Echocardiogram done on 6/13/2025 showed moderately reduced systolic function with EF 40%, global hypokinesis, diastolic function unable to assess  Not currently on telemetry  Had mildly elevated TSH but with normal free T4.  Recommend repeat studies outpatient in 4 to 6 week  Cardiomyopathy, new diagnosis  Echocardiogram results from 6/13/2025 showing moderately reduced systolic function 40%, with global hypokinesis  Suspect alcoholic cardiomyopathy in the setting of heavy alcohol use over multiple years  Currently euvolemic without any  findings to suggest volume overload  He will be started on low-dose beta-blocker and ACE inhibitor   Highly recommended to abstain from further alcohol use   Cardiology will be consulted  Dental infection  Continue amoxicillin for scheduled duration as prescribed outpatient    VTE Pharmacologic Prophylaxis: VTE Score: 4 Moderate Risk (Score 3-4) - Pharmacological DVT Prophylaxis Ordered: enoxaparin (Lovenox).    Mobility:   Basic Mobility Inpatient Raw Score: 22  JH-HLM Goal: 7: Walk 25 feet or more  JH-HLM Achieved: 7: Walk 25 feet or more  JH-HLM Goal NOT achieved. Continue with multidisciplinary rounding and encourage appropriate mobility to improve upon JH-HLM goals.    Patient Centered Rounds: I performed bedside rounds with nursing staff today.   Discussions with Specialists or Other Care Team Provider: Nursing    Education and Discussions with Family / Patient: Updated  (wife) at bedside.    Current Length of Stay: 2 day(s)  Current Patient Status: Inpatient   Certification Statement: The patient will continue to require additional inpatient hospital stay due to alcohol withdrawal with new finding of cardiomyopathy  Discharge Plan: Pending clinical improvement    Code Status: Level 1 - Full Code    Subjective   The patient is without any new complaints this morning.  He continues to experience tremors and is unsteady while ambulating.  Denies any visual, auditory or tactile hallucinations.  He is tolerating p.o. without nausea or vomiting.    Objective :  Temp:  [97.6 °F (36.4 °C)-98.7 °F (37.1 °C)] 97.7 °F (36.5 °C)  HR:  [] 76  BP: (104-141)/(66-93) 122/90  Resp:  [16-20] 16  SpO2:  [95 %-98 %] 98 %  O2 Device: None (Room air)    Body mass index is 30.13 kg/m².     Input and Output Summary (last 24 hours):     Intake/Output Summary (Last 24 hours) at 6/14/2025 124  Last data filed at 6/14/2025 0555  Gross per 24 hour   Intake --   Output 700 ml   Net -700 ml       Physical Exam  Vitals  reviewed.   Constitutional:       General: He is not in acute distress.     Appearance: He is not toxic-appearing or diaphoretic.   HENT:      Head: Normocephalic and atraumatic.      Mouth/Throat:      Mouth: Mucous membranes are moist.     Eyes:      General: No scleral icterus.        Right eye: No discharge.         Left eye: No discharge.       Cardiovascular:      Rate and Rhythm: Regular rhythm. Tachycardia present.      Heart sounds: Normal heart sounds.   Pulmonary:      Effort: Pulmonary effort is normal. No respiratory distress.      Breath sounds: Normal breath sounds. No wheezing, rhonchi or rales.   Abdominal:      General: There is no distension.      Palpations: Abdomen is soft. There is no mass.      Tenderness: There is no abdominal tenderness.     Musculoskeletal:      Cervical back: Neck supple.      Right lower leg: No edema.      Left lower leg: No edema.     Skin:     General: Skin is warm and dry.     Neurological:      General: No focal deficit present.      Mental Status: He is alert and oriented to person, place, and time.         Lab Results: I have reviewed the following results:   Results from last 7 days   Lab Units 06/14/25  0454 06/12/25  0542   WBC Thousand/uL 6.16 3.97*   HEMOGLOBIN g/dL 14.9 14.5   HEMATOCRIT % 43.6 42.6   PLATELETS Thousands/uL 163 192   SEGS PCT %  --  51   LYMPHO PCT %  --  32   MONO PCT %  --  13*   EOS PCT %  --  3     Results from last 7 days   Lab Units 06/14/25  0454   SODIUM mmol/L 134*   POTASSIUM mmol/L 3.5   CHLORIDE mmol/L 94*   CO2 mmol/L 26   BUN mg/dL 9   CREATININE mg/dL 0.72   ANION GAP mmol/L 14*   CALCIUM mg/dL 9.5   ALBUMIN g/dL 4.7   TOTAL BILIRUBIN mg/dL 1.29*   ALK PHOS U/L 48   ALT U/L 99*   AST U/L 153*   GLUCOSE RANDOM mg/dL 77     Results from last 7 days   Lab Units 06/11/25  1947   INR  0.89     Results from last 7 days   Lab Units 06/11/25  1905   POC GLUCOSE mg/dl 120     Last 24 Hours Medication List:     Current  Facility-Administered Medications:     acetaminophen (TYLENOL) tablet 650 mg, Q4H PRN    aluminum-magnesium hydroxide-simethicone (MAALOX) oral suspension 30 mL, Q6H PRN    amoxicillin (AMOXIL) capsule 500 mg, Q8H REECE    diazepam (VALIUM) injection 10 mg, Q1H PRN    diazepam (VALIUM) tablet 10 mg, Q4H PRN    enoxaparin (LOVENOX) subcutaneous injection 40 mg, Daily    folic acid (FOLVITE) tablet 1 mg, Daily    magnesium Oxide (MAG-OX) tablet 400 mg, BID    meclizine (ANTIVERT) tablet 25 mg, TID PRN    multivitamin-minerals (CENTRUM) tablet 1 tablet, Daily    naltrexone (REVIA) tablet 50 mg, Daily    nicotine (NICODERM CQ) 21 mg/24 hr TD 24 hr patch 1 patch, Daily    nicotine polacrilex (NICORETTE) gum 2 mg, Q2H PRN    pantoprazole (PROTONIX) EC tablet 40 mg, Early Morning    sodium chloride 0.9 % infusion, Continuous, Last Rate: 100 mL/hr (06/14/25 0933)    thiamine tablet 100 mg, Daily    trimethobenzamide (TIGAN) IM injection 200 mg, Q6H PRN    Administrative Statements   Today, Patient Was Seen By: Gita Webster MD    **Please Note: This note may have been constructed using a voice recognition system.**

## 2025-06-14 NOTE — ASSESSMENT & PLAN NOTE
All workup to date has been unremarkable for any acute cardiac disease  Likely all related to alcohol abuse  Echocardiogram done on 6/13/2025 showed moderately reduced systolic function with EF 40%, global hypokinesis, diastolic function unable to assess  Not currently on telemetry  Had mildly elevated TSH but with normal free T4.  Recommend repeat studies outpatient in 4 to 6 week

## 2025-06-14 NOTE — ASSESSMENT & PLAN NOTE
Echocardiogram results from 6/13/2025 showing moderately reduced systolic function 40%, with global hypokinesis  Suspect alcoholic cardiomyopathy in the setting of heavy alcohol use over multiple years  Currently euvolemic without any findings to suggest volume overload  He will be started on low-dose beta-blocker and ACE inhibitor   Highly recommended to abstain from further alcohol use   Cardiology will be consulted

## 2025-06-14 NOTE — ASSESSMENT & PLAN NOTE
CIWA scores have been lower today  Seen by toxicology, recommendations appreciated  On Valium for withdrawal symptoms and has continued to require PRN Valium with last IV dose given overnight at 2:39 AM  Scheduled Librium discontinued yesterday per toxicology recommendations  Liver enzymes trended down and likely elevated secondary to heavy alcohol abuse  Started on naltrexone, currently on 50 mg daily.  The patient was agreeable to this

## 2025-06-14 NOTE — ASSESSMENT & PLAN NOTE
Presentation more likely related to alcohol intoxication and blacking out as opposed to true syncope. I do not see any evidence of cardiac syncope. LVEF is greater than 35% no indication for LifeVest. His telemetry previously was reported to be unremarkable.

## 2025-06-14 NOTE — ASSESSMENT & PLAN NOTE
Acute systolic congestive heart failure. I suspect an alcohol mediated cardiomyopathy. Discussed diagnosis in detail with patient. Ejection fraction is 40%. Likely unrelated to his presentation of loss of consciousness which more likely was related to the alcohol intoxication. He was previously monitored on telemetry although I do not have the strips to review there are no events noted.    Low doses of lisinopril and metoprolol were started. I will add spironolactone tomorrow. This should not produce significant diuresis I do see that he was getting some fluid previously. He does not need any loop diuretics currently. He reports edema but this is improved. I am going to hold off on the SGLT2 inhibitor for this reason for now but we will plan to add this.    Titrate up the beta-blocker and ACE inhibitor as tolerated.    Will need outpatient follow-up.

## 2025-06-15 VITALS
RESPIRATION RATE: 16 BRPM | HEART RATE: 103 BPM | TEMPERATURE: 98.4 F | WEIGHT: 210 LBS | OXYGEN SATURATION: 96 % | BODY MASS INDEX: 30.06 KG/M2 | HEIGHT: 70 IN | DIASTOLIC BLOOD PRESSURE: 76 MMHG | SYSTOLIC BLOOD PRESSURE: 112 MMHG

## 2025-06-15 LAB
ALBUMIN SERPL BCG-MCNC: 4.5 G/DL (ref 3.5–5)
ALP SERPL-CCNC: 52 U/L (ref 34–104)
ALT SERPL W P-5'-P-CCNC: 71 U/L (ref 7–52)
ANION GAP SERPL CALCULATED.3IONS-SCNC: 13 MMOL/L (ref 4–13)
AST SERPL W P-5'-P-CCNC: 75 U/L (ref 13–39)
BILIRUB SERPL-MCNC: 1.01 MG/DL (ref 0.2–1)
BUN SERPL-MCNC: 14 MG/DL (ref 5–25)
CALCIUM SERPL-MCNC: 9.3 MG/DL (ref 8.4–10.2)
CHLORIDE SERPL-SCNC: 96 MMOL/L (ref 96–108)
CO2 SERPL-SCNC: 25 MMOL/L (ref 21–32)
CREAT SERPL-MCNC: 0.83 MG/DL (ref 0.6–1.3)
ERYTHROCYTE [DISTWIDTH] IN BLOOD BY AUTOMATED COUNT: 12.6 % (ref 11.6–15.1)
GFR SERPL CREATININE-BSD FRML MDRD: 108 ML/MIN/1.73SQ M
GLUCOSE SERPL-MCNC: 88 MG/DL (ref 65–140)
HCT VFR BLD AUTO: 42.6 % (ref 36.5–49.3)
HGB BLD-MCNC: 14.4 G/DL (ref 12–17)
MCH RBC QN AUTO: 33.2 PG (ref 26.8–34.3)
MCHC RBC AUTO-ENTMCNC: 33.8 G/DL (ref 31.4–37.4)
MCV RBC AUTO: 98 FL (ref 82–98)
PLATELET # BLD AUTO: 167 THOUSANDS/UL (ref 149–390)
PMV BLD AUTO: 9.7 FL (ref 8.9–12.7)
POTASSIUM SERPL-SCNC: 3.8 MMOL/L (ref 3.5–5.3)
PROT SERPL-MCNC: 7.3 G/DL (ref 6.4–8.4)
RBC # BLD AUTO: 4.34 MILLION/UL (ref 3.88–5.62)
SODIUM SERPL-SCNC: 134 MMOL/L (ref 135–147)
WBC # BLD AUTO: 6.81 THOUSAND/UL (ref 4.31–10.16)

## 2025-06-15 PROCEDURE — 80053 COMPREHEN METABOLIC PANEL: CPT | Performed by: INTERNAL MEDICINE

## 2025-06-15 PROCEDURE — 99239 HOSP IP/OBS DSCHRG MGMT >30: CPT | Performed by: INTERNAL MEDICINE

## 2025-06-15 PROCEDURE — 85027 COMPLETE CBC AUTOMATED: CPT | Performed by: INTERNAL MEDICINE

## 2025-06-15 RX ORDER — NICOTINE 21 MG/24HR
1 PATCH, TRANSDERMAL 24 HOURS TRANSDERMAL DAILY
Qty: 28 PATCH | Refills: 0 | Status: SHIPPED | OUTPATIENT
Start: 2025-06-16

## 2025-06-15 RX ORDER — NALTREXONE HYDROCHLORIDE 50 MG/1
50 TABLET, FILM COATED ORAL DAILY
Qty: 30 TABLET | Refills: 0 | Status: SHIPPED | OUTPATIENT
Start: 2025-06-16 | End: 2025-07-02

## 2025-06-15 RX ORDER — FOLIC ACID 1 MG/1
1 TABLET ORAL DAILY
Qty: 30 TABLET | Refills: 0 | Status: SHIPPED | OUTPATIENT
Start: 2025-06-16 | End: 2025-07-02

## 2025-06-15 RX ORDER — LANOLIN ALCOHOL/MO/W.PET/CERES
100 CREAM (GRAM) TOPICAL DAILY
Qty: 30 TABLET | Refills: 0 | Status: SHIPPED | OUTPATIENT
Start: 2025-06-16 | End: 2025-07-02

## 2025-06-15 RX ORDER — LISINOPRIL 2.5 MG/1
2.5 TABLET ORAL DAILY
Qty: 30 TABLET | Refills: 0 | Status: SHIPPED | OUTPATIENT
Start: 2025-06-16 | End: 2025-07-02

## 2025-06-15 RX ORDER — HYDROXYZINE HYDROCHLORIDE 25 MG/1
25 TABLET, FILM COATED ORAL EVERY 8 HOURS PRN
Qty: 15 TABLET | Refills: 0 | Status: SHIPPED | OUTPATIENT
Start: 2025-06-15

## 2025-06-15 RX ORDER — SPIRONOLACTONE 25 MG/1
12.5 TABLET ORAL DAILY
Qty: 30 TABLET | Refills: 0 | Status: SHIPPED | OUTPATIENT
Start: 2025-06-16 | End: 2025-07-02

## 2025-06-15 RX ORDER — PANTOPRAZOLE SODIUM 40 MG/1
40 TABLET, DELAYED RELEASE ORAL
Qty: 30 TABLET | Refills: 0 | Status: SHIPPED | OUTPATIENT
Start: 2025-06-16

## 2025-06-15 RX ORDER — METOPROLOL TARTRATE 25 MG/1
12.5 TABLET, FILM COATED ORAL EVERY 12 HOURS SCHEDULED
Qty: 30 TABLET | Refills: 0 | Status: SHIPPED | OUTPATIENT
Start: 2025-06-15 | End: 2025-07-02

## 2025-06-15 RX ORDER — LANOLIN ALCOHOL/MO/W.PET/CERES
400 CREAM (GRAM) TOPICAL 2 TIMES DAILY
Qty: 10 TABLET | Refills: 0 | Status: SHIPPED | OUTPATIENT
Start: 2025-06-15

## 2025-06-15 RX ADMIN — FOLIC ACID 1 MG: 1 TABLET ORAL at 08:33

## 2025-06-15 RX ADMIN — Medication 1 TABLET: at 08:33

## 2025-06-15 RX ADMIN — AMOXICILLIN 500 MG: 250 CAPSULE ORAL at 05:00

## 2025-06-15 RX ADMIN — NALTREXONE HYDROCHLORIDE 50 MG: 50 TABLET, FILM COATED ORAL at 08:34

## 2025-06-15 RX ADMIN — Medication 400 MG: at 08:32

## 2025-06-15 RX ADMIN — NICOTINE 1 PATCH: 21 PATCH, EXTENDED RELEASE TRANSDERMAL at 08:33

## 2025-06-15 RX ADMIN — SPIRONOLACTONE 12.5 MG: 25 TABLET, FILM COATED ORAL at 08:32

## 2025-06-15 RX ADMIN — ENOXAPARIN SODIUM 40 MG: 40 INJECTION SUBCUTANEOUS at 08:32

## 2025-06-15 RX ADMIN — PANTOPRAZOLE SODIUM 40 MG: 40 TABLET, DELAYED RELEASE ORAL at 05:00

## 2025-06-15 RX ADMIN — LISINOPRIL 2.5 MG: 2.5 TABLET ORAL at 08:33

## 2025-06-15 RX ADMIN — THIAMINE HCL TAB 100 MG 100 MG: 100 TAB at 08:33

## 2025-06-15 RX ADMIN — Medication 12.5 MG: at 08:33

## 2025-06-15 NOTE — NURSING NOTE
Pt is discharge to home today.  Review discharge information and follow up care. No acute distress noted at this time.  Pt has all belonging with him.

## 2025-06-15 NOTE — PLAN OF CARE
Problem: PAIN - ADULT  Goal: Verbalizes/displays adequate comfort level or baseline comfort level  Description: Interventions:  - Encourage patient to monitor pain and request assistance  - Assess pain using appropriate pain scale  - Administer analgesics as ordered based on type and severity of pain and evaluate response  - Implement non-pharmacological measures as appropriate and evaluate response  - Consider cultural and social influences on pain and pain management  - Notify physician/advanced practitioner if interventions unsuccessful or patient reports new pain  - Educate patient/family on pain management process including their role and importance of  reporting pain   - Provide non-pharmacologic/complimentary pain relief interventions  Outcome: Progressing     Problem: INFECTION - ADULT  Goal: Absence or prevention of progression during hospitalization  Description: INTERVENTIONS:  - Assess and monitor for signs and symptoms of infection  - Monitor lab/diagnostic results  - Monitor all insertion sites, i.e. indwelling lines, tubes, and drains  - Monitor endotracheal if appropriate and nasal secretions for changes in amount and color  - [place] appropriate cooling/warming therapies per order  - Administer medications as ordered  - Instruct and encourage patient and family to use good hand hygiene technique  - Identify and instruct in appropriate isolation precautions for identified infection/condition  Outcome: Progressing  Goal: Absence of fever/infection during neutropenic period  Description: INTERVENTIONS:  - Monitor WBC  - Perform strict hand hygiene  - Limit to healthy visitors only  - No plants, dried, fresh or silk flowers with cohen in patient room  Outcome: Progressing     Problem: DISCHARGE PLANNING  Goal: Discharge to home or other facility with appropriate resources  Description: INTERVENTIONS:  - Identify barriers to discharge w/patient and caregiver  - Arrange for needed discharge resources  and transportation as appropriate  - Identify discharge learning needs (meds, wound care, etc.)  - Arrange for interpretive services to assist at discharge as needed  - Refer to Case Management Department for coordinating discharge planning if the patient needs post-hospital services based on physician/advanced practitioner order or complex needs related to functional status, cognitive ability, or social support system  Outcome: Progressing     Problem: Knowledge Deficit  Goal: Patient/family/caregiver demonstrates understanding of disease process, treatment plan, medications, and discharge instructions  Description: Complete learning assessment and assess knowledge base.  Interventions:  - Provide teaching at level of understanding  - Provide teaching via preferred learning methods  Outcome: Progressing

## 2025-06-15 NOTE — DISCHARGE SUMMARY
Discharge Summary - [facility] Internal Medicine    Patient Information: Balaji Hamlin 44 y.o. male MRN: [id number]  Unit/Bed#: -01 Encounter: [id number]    Discharging Physician / Practitioner: Gita Webster MD  PCP: No primary care provider on file.  Admission Date: 6/11/2025  Discharge Date: 06/15/25    Reason for Admission: Alcohol use disorder, severe dependence with acute withdrawal    Discharge Diagnoses:     Principal Problem:    Alcohol use disorder, severe, dependence (HCC)  Active Problems:    Syncope    Alcoholic ketoacidosis    Dental infection    Alcohol withdrawal syndrome (HCC)    Cardiomyopathy, new diagnosis      Consultations During Hospital Stay:  Cardiology    Procedures Performed:   None    Significant findings:  Echocardiogram -  Left Ventricle: Left ventricular cavity size is mildly dilated. Wall thickness is normal. The left ventricular ejection fraction is 40% by biplane measurement. Systolic function is moderately reduced. There is global hypokinesis. Unable to assess diastolic function due to E/A fusion. Right Ventricle: Right ventricular cavity size is normal. Systolic function is low normal. No prior study available for comparison.    Hospital Course:   Balaji Hamlin is a 44 y.o. male patient with history of alcohol use disorder who originally presented to the hospital on 6/11/2025 due to heavy alcohol use, with syncope and request for detox.  The patient was acutely intoxicated at the time of presentation with Tylenol level 535.  He was diagnosed with alcoholic ketoacidosis.  He received IV fluids with resolution of ketoacidosis.  The patient declined transfer to detox center at [place].  He was initially monitored on telemetry which was unremarkable.  He was maintained on CIWA protocol with Ativan initially and subsequently maintained on oral and IV Valium.  He was seen in consultation by toxicology and recommended to start naltrexone which he was started on during  "his hospital course.  Echocardiogram which is reported above showed new finding of cardiomyopathy which was determined to most likely be related to alcoholic cardiomyopathy.  He was seen by cardiology and recommended to continue beta-blocker ACE inhibitor and Aldactone.  He is to follow-up with cardiology outpatient.  His last dose of Valium was early morning 6/14/2025 at 2:39 AM.  He has not required any further benzo in the past 24+ hours and has remained stable.  He is cleared for discharge home today with outpatient follow-up.  The patient was counseled to refrain from further alcohol use especially in light of new diagnosis cardiomyopathy.  He was seen by [facility] program during his hospital course and provided with all relevant information for outpatient alcohol rehab.    Condition at Discharge: good     Discharge Day Visit / Exam:     Subjective: Doing very well this morning and had no new complaints.  He reported no extremity shakes, no hallucinations, no chest pain or shortness of breath, fever or chills.    Vitals: Blood Pressure: 112/76 (06/15/25 0833)  Pulse: 103 (06/15/25 0749)  Temperature: 98.4 °F (36.9 °C) (06/15/25 0749)  Temp Source: Tympanic (06/15/25 0749)  Respirations: 16 (06/15/25 0749)  Height: 5' 10\" (177.8 cm) (06/13/25 1130)  Weight - Scale: 95.3 kg (210 lb) (06/13/25 1130)  SpO2: 96 % (06/15/25 0749)    General Appearance:    Alert, cooperative, no distress, appropriately responsive    Head:    Normocephalic, mucous membranes moist   Eyes:    Conjunctiva/corneas clear   Neck:   Supple   Lungs:     Clear to auscultation bilaterally, respirations unlabored, no crackles or wheeze     Heart:    Regular rate and rhythm, S1 and S2    Abdomen:     Soft, non-tender, nondistended   Extremities:   No lower extremity edema   Neurologic:  nonfocal      Discharge instructions/Information to patient and family:   See after visit summary for information provided to patient and family.      Provisions for " Follow-Up Care:  See after visit summary for information related to follow-up care and any pertinent home health orders.      Disposition: Home    Patient's spouse was updated on phone, all questions answered.    Discharge Statement:  I spent >30 minutes discharging the patient. This time was spent on the day of discharge. I had direct contact with the patient on the day of discharge. Greater than 50% of the total time was spent examining patient, answering all patient questions, arranging and discussing plan of care with patient as well as directly providing post-discharge instructions.  Additional time then spent on discharge activities.    Discharge Medications:  See after visit summary for reconciled discharge medications provided to patient and family.      ** Please Note: Dragon 360 Dictation voice to text software may have been used in the creation of this document. **

## 2025-06-15 NOTE — DISCHARGE INSTR - AVS FIRST PAGE
Dear Balaji Hamlin,     It was our pleasure to care for you here at [facility].  It is our hope that we were always able to exceed the expected standards for your care during your stay.  You were hospitalized due to acute alcohol intoxication, alcohol withdrawal, syncope, new cardiomyopathy.  You were cared for on the third floor under the service of Gita Webster MD with the [facility]ist [facility] who covers for your primary care physician (PCP), No primary care provider on file., while you were hospitalized.  If you have any questions or concerns related to this hospitalization, you may contact us at .  For follow up as well as medication refills, we recommend that you follow up with your primary care physician.  A registered nurse will reach out to you by phone within a few days after your discharge to answer any additional questions that you may have after going home.  However, at this time we provide for you here, the most important instructions / recommendations at discharge:       Notable Medication Adjustments -you have been started on the medications below.  Please continue as instructed  Lisinopril 2.5 mg daily  Metoprolol 12.5 mg twice daily  Spironolactone 12.5 mg daily  Naltrexone 50 mg daily  Folic acid 1 mg daily  Thiamine 100 mg daily  Multivitamin 1 tablet daily  Take Atarax 25 mg every 8 hours as needed for anxiety    Testing Required after Discharge -   Please follow-up with a cardiologist for additional testing as needed    Important follow up information -   Please follow-up with your primary care provider in 1 week and with a cardiologist in 1 to 3 weeks    Other Instructions -   We hope you feel better soon. If your symptoms worsen, please call 911 immediately or go to the nearest Emergency Department.    Please review this entire after visit summary as additional general instructions including medication list, appointments,  activity, diet, any pertinent wound care, and other additional recommendations from your care team that may be provided for you.      Sincerely,     Gita Webster MD

## 2025-06-15 NOTE — PLAN OF CARE
Problem: SAFETY ADULT  Goal: Patient will remain free of falls  Description: INTERVENTIONS:  - Educate patient/family on patient safety including physical limitations  - Instruct patient to call for assistance with activity   - Consider consulting OT/PT to assist with strengthening/mobility based on AM PAC & JH-HLM score  - Consult OT/PT to assist with strengthening/mobility   - Keep Call bell within reach  - Keep bed low and locked with side rails adjusted as appropriate  - Keep care items and personal belongings within reach  - Initiate and maintain comfort rounds  - Make Fall Risk Sign visible to staff  - Offer Toileting every 2 Hours, in advance of need  - Initiate/Maintain bed alarm  - Obtain necessary fall risk management equipment:   - Apply yellow socks and bracelet for high fall risk patients  - Consider moving patient to room near nurses station  Outcome: Progressing     Problem: DISCHARGE PLANNING  Goal: Discharge to home or other facility with appropriate resources  Description: INTERVENTIONS:  - Identify barriers to discharge w/patient and caregiver  - Arrange for needed discharge resources and transportation as appropriate  - Identify discharge learning needs (meds, wound care, etc.)  - Arrange for interpretive services to assist at discharge as needed  - Refer to Case Management Department for coordinating discharge planning if the patient needs post-hospital services based on physician/advanced practitioner order or complex needs related to functional status, cognitive ability, or social support system  Outcome: Progressing     Problem: Knowledge Deficit  Goal: Patient/family/caregiver demonstrates understanding of disease process, treatment plan, medications, and discharge instructions  Description: Complete learning assessment and assess knowledge base.  Interventions:  - Provide teaching at level of understanding  - Provide teaching via preferred learning methods  Outcome: Progressing

## 2025-07-02 ENCOUNTER — OFFICE VISIT (OUTPATIENT)
Dept: CARDIOLOGY CLINIC | Facility: CLINIC | Age: 45
End: 2025-07-02
Attending: INTERNAL MEDICINE

## 2025-07-02 VITALS
WEIGHT: 204.8 LBS | DIASTOLIC BLOOD PRESSURE: 70 MMHG | HEIGHT: 70 IN | OXYGEN SATURATION: 97 % | SYSTOLIC BLOOD PRESSURE: 100 MMHG | BODY MASS INDEX: 29.32 KG/M2 | HEART RATE: 80 BPM | TEMPERATURE: 97.8 F

## 2025-07-02 DIAGNOSIS — R55 SYNCOPE, UNSPECIFIED SYNCOPE TYPE: ICD-10-CM

## 2025-07-02 DIAGNOSIS — I42.8 OTHER CARDIOMYOPATHY (HCC): ICD-10-CM

## 2025-07-02 DIAGNOSIS — E83.42 HYPOMAGNESEMIA: ICD-10-CM

## 2025-07-02 DIAGNOSIS — I42.6 ALCOHOLIC CARDIOMYOPATHY (HCC): ICD-10-CM

## 2025-07-02 DIAGNOSIS — E87.1 HYPONATREMIA: ICD-10-CM

## 2025-07-02 DIAGNOSIS — F10.20 ALCOHOL USE DISORDER, SEVERE, DEPENDENCE (HCC): ICD-10-CM

## 2025-07-02 PROCEDURE — 99214 OFFICE O/P EST MOD 30 MIN: CPT

## 2025-07-02 RX ORDER — METOPROLOL SUCCINATE 25 MG/1
25 TABLET, EXTENDED RELEASE ORAL DAILY
Qty: 90 TABLET | Refills: 1 | Status: SHIPPED | OUTPATIENT
Start: 2025-07-02

## 2025-07-02 RX ORDER — SPIRONOLACTONE 25 MG/1
12.5 TABLET ORAL DAILY
Qty: 45 TABLET | Refills: 1 | Status: SHIPPED | OUTPATIENT
Start: 2025-07-02

## 2025-07-02 RX ORDER — LANOLIN ALCOHOL/MO/W.PET/CERES
400 CREAM (GRAM) TOPICAL 2 TIMES DAILY
Qty: 10 TABLET | Refills: 0 | Status: CANCELLED | OUTPATIENT
Start: 2025-07-02

## 2025-07-02 RX ORDER — NALTREXONE HYDROCHLORIDE 50 MG/1
50 TABLET, FILM COATED ORAL DAILY
Qty: 30 TABLET | Refills: 1 | Status: SHIPPED | OUTPATIENT
Start: 2025-07-02

## 2025-07-02 RX ORDER — FOLIC ACID 1 MG/1
1 TABLET ORAL DAILY
Qty: 30 TABLET | Refills: 1 | Status: SHIPPED | OUTPATIENT
Start: 2025-07-02

## 2025-07-02 RX ORDER — LISINOPRIL 2.5 MG/1
2.5 TABLET ORAL DAILY
Qty: 90 TABLET | Refills: 1 | Status: SHIPPED | OUTPATIENT
Start: 2025-07-02

## 2025-07-02 RX ORDER — LANOLIN ALCOHOL/MO/W.PET/CERES
100 CREAM (GRAM) TOPICAL DAILY
Qty: 30 TABLET | Refills: 1 | Status: SHIPPED | OUTPATIENT
Start: 2025-07-02

## 2025-07-02 NOTE — PROGRESS NOTES
Balaji Hamlin  1980  [id number]  [facility] [place] [place] [facility] [address] [phone]    1. Alcoholic cardiomyopathy (HCC)  Ambulatory referral to Cardiology    lisinopril (ZESTRIL) 2.5 mg tablet    spironolactone (ALDACTONE) 25 mg tablet      2. Alcohol use disorder, severe, dependence (HCC)  lisinopril (ZESTRIL) 2.5 mg tablet    naltrexone (REVIA) 50 mg tablet    folic acid (FOLVITE) 1 mg tablet    thiamine 100 MG tablet      3. Other cardiomyopathy (HCC)  metoprolol succinate (TOPROL-XL) 25 mg 24 hr tablet    Echo follow up/limited w/ contrast if indicated      4. Hypomagnesemia  Magnesium      5. Hyponatremia  Comprehensive metabolic panel      6. Syncope, unspecified syncope type            Summary/Discussion:  Cardiomyopathy, new diagnosis; LVEF 40%  Acute systolic congestive heart failure  - hospital admission on 6/11/2025  - suspect etiology alcohol mediated cardiomyopathy  - no clinical s/s of volume overload   - sodium 134 by labs   repeat CMP    - GDMT: metoprolol,  low doses of lisinopril and spironolactone   switch lopressor to toprol XL 25 mg daily   no additional therapy added due to low blood pressure, 100/70  - plan for repeat echo in 3 months to follow up on LVEF. If still low, will discuss further investigation with ischemic evaluation     Alcohol use disorder, severe, dependence   - recent hospital admission   - does not have PCP-- advised him to obtain for further management  - continue present medication regimen-- provided refills until he is able to follow up with a PCP  - reports no recent alcohol intake     Syncope  - during recent hospitalization. Presentation suspected likely related to alcohol intoxication and blacking out as opposed to true syncope  - LVEF is greater than 35% no indication for LifeVest  - no recurrence    Hypomagnesemia  - repeat magnesium     Tobacco use:  - complete smoking cessation encouraged      Interval  History: Balaji Hamlin is a 44 y.o. year old male with history mentioned in problem list who presents to the office today for a hospital follow up.     Today, he does not report any significant cardiac complaints. He denies any chest pain/pressure/discomfort or shortness of breath. He denies lower extremity edema, orthopnea, and PND. He denies lightheadedness, dizziness, and syncope. He denies palpitations.      He will RTO in 4 weeks for volume check or sooner if necessary. He will call with any concerns.         Medical Problems       Problem List       Alcohol use disorder, severe, dependence (HCC)    Syncope    Alcoholic ketoacidosis    Dental infection    Alcohol withdrawal syndrome (HCC)    Cardiomyopathy, new diagnosis        Past Medical History[1]  Social History     Socioeconomic History    Marital status: /Civil Union     Spouse name: Not on file    Number of children: Not on file    Years of education: Not on file    Highest education level: Not on file   Occupational History    Not on file   Tobacco Use    Smoking status: Every Day     Current packs/day: 0.50     Types: Cigarettes    Smokeless tobacco: Never   Vaping Use    Vaping status: Never Used   Substance and Sexual Activity    Alcohol use: Not Currently     Comment: daily    Drug use: No    Sexual activity: Not on file   Other Topics Concern    Not on file   Social History Narrative    Not on file     Social Drivers of Health     Financial Resource Strain: Not on file   Food Insecurity: No Food Insecurity (6/12/2025)    Nursing - Inadequate Food Risk Classification     Worried About Running Out of Food in the Last Year: Not on file     Ran Out of Food in the Last Year: Not on file     Ran Out of Food in the Last Year: Never true   Transportation Needs: No Transportation Needs (6/12/2025)    Nursing - Transportation Risk Classification     Lack of Transportation: Not on file     Lack of Transportation: No   Physical Activity: Not on file  "  Stress: Not on file   Social Connections: Not on file   Intimate Partner Violence: Unknown (6/12/2025)    Nursing IPS     Feels Physically and Emotionally Safe: Not on file     Physically Hurt by Someone: Not on file     Humiliated or Emotionally Abused by Someone: Not on file     Physically Hurt by Someone: No     Hurt or Threatened by Someone: No   Housing Stability: Unknown (6/12/2025)    Nursing: Inadequate Housing Risk Classification     Has Housing: Not on file     Worried About Losing Housing: Not on file     Unable to Get Utilities: Not on file     Unable to Pay for Housing in the Last Year: No     Has Housing: No      Family History[2]  Past Surgical History[3]  Current Medications[4]  No Known Allergies    Labs:     Chemistry        Component Value Date/Time    K 3.8 06/15/2025 0454    CL 96 06/15/2025 0454    CO2 25 06/15/2025 0454    BUN 14 06/15/2025 0454    CREATININE 0.83 06/15/2025 0454        Component Value Date/Time    CALCIUM 9.3 06/15/2025 0454    ALKPHOS 52 06/15/2025 0454    AST 75 (H) 06/15/2025 0454    ALT 71 (H) 06/15/2025 0454            No results found for: \"CHOL\"  No results found for: \"HDL\"  No results found for: \"LDLCALC\"  No results found for: \"TRIG\"  No results found for: \"CHOLHDL\"    Imaging: Echo complete w/ contrast if indicated  Result Date: 6/13/2025  Narrative:   Left Ventricle: Left ventricular cavity size is mildly dilated. Wall thickness is normal. The left ventricular ejection fraction is 40% by biplane measurement. Systolic function is moderately reduced. There is global hypokinesis. Unable to assess diastolic function due to E/A fusion.   Right Ventricle: Right ventricular cavity size is normal. Systolic function is low normal.   No prior study available for comparison.     VAS carotid complete study  Result Date: 6/12/2025  Narrative: [facility] . CHA EL is a 44 year old M who was referred by TYLER GEE.    Indications: Patient presents " with recent episode of syncope lasting several minutes.  He is currently asymptomatic. Operative History: No cardiovascular surgery Risk Factors: The patient reports smoking: current 0.5 packs per day.   Clinical: Right BP: IV placement, Left BP: 125/69 FINDINGS: Main                               Segment Impression  PSV  EDV  Ratio  Direction of Flow Right                                                                                  Prox CCA                                        64   16                             Mid CCA                                         82   17                             Dist CCA                                        76   17                             Prox. ICA                              Normal   50   17                             Mid. ICA                                        41   16                             Dist. ICA                                       61   23                             Ext Carotid                                     75   15                             Prox Vert.                                      28   10   1.40          Antegrade    Right Subclavian Artery                        143   13                          Left                                                                                   Prox CCA                                        90   16                             Mid CCA                                         77   19                             Dist CCA                                        76   18                             Prox. ICA                              Normal   46   21                             Mid. ICA                                        68   24                             Dist. ICA                                       46   20                             Ext Carotid                                     92   16                             Prox Vert                                       35   10   1.40           Antegrade    Left Subclavian Artery                         127   13                          CONCLUSION:  RIGHT: There is no evidence of disease throughout the extracranial carotid arteries. Vertebral artery flow is antegrade.  There is no significant subclavian artery disease.   LEFT: There is no evidence of disease throughout the extracranial carotid arteries. Vertebral artery flow is antegrade.  There is no significant subclavian artery disease.  SIGNATURE Signed by ANTHONY DUKE on 2025-06-12 10:44:00 [email or web address]#Images    XR chest 1 view portable  Result Date: 6/11/2025  Narrative: XR CHEST PORTABLE INDICATION: Near syncope/syncope. Smoker. COMPARISON: 7/31/2024 FINDINGS: Clear lungs. No pneumothorax or pleural effusion. Normal cardiomediastinal silhouette. Bones are unremarkable for age. Normal upper abdomen.     Impression: No acute cardiopulmonary disease. Workstation performed: [id number]     CT head without contrast  Result Date: 6/11/2025  Narrative: CT BRAIN - WITHOUT CONTRAST INDICATION:   Intoxication, near syncope/syncope, headache, prior head injury. COMPARISON: CT head 7/31/2024. TECHNIQUE:  CT examination of the brain was performed.  Multiplanar 2D reformatted images were created from the source data. Radiation dose length product (DLP) for this visit:  861.7 mGy-cm. .  This examination, like all CT scans performed in the [facility], was performed utilizing techniques to minimize radiation dose exposure, including the use of iterative  reconstruction and automated exposure control. IMAGE QUALITY:  Diagnostic. FINDINGS: PARENCHYMA:No intracranial mass, mass effect or midline shift. No CT signs of acute infarction.  No acute parenchymal hemorrhage. VENTRICLES AND EXTRA-AXIAL SPACES:  Normal for the patient's age. VISUALIZED ORBITS: Normal visualized orbits. PARANASAL SINUSES:  "Normal visualized paranasal sinuses. CALVARIUM AND EXTRACRANIAL SOFT TISSUES: Normal.     Impression: No acute intracranial abnormality. Workstation performed: [id number]       ECG:  n/a    Review of Systems   All other systems reviewed and are negative.      Vitals:    07/02/25 0909   BP: 100/70   Pulse: 80   Temp: 97.8 °F (36.6 °C)   SpO2: 97%     Vitals:    07/02/25 0909   Weight: 92.9 kg (204 lb 12.8 oz)     Height: 5' 10\" (177.8 cm)   Body mass index is 29.39 kg/m².    Physical Exam  Vitals and nursing note reviewed.   Constitutional:       General: He is not in acute distress.     Appearance: Normal appearance.   HENT:      Head: Normocephalic.      Nose: Nose normal.      Mouth/Throat:      Mouth: Mucous membranes are moist.      Pharynx: Oropharynx is clear.     Cardiovascular:      Rate and Rhythm: Normal rate and regular rhythm.      Pulses: Normal pulses.      Heart sounds: Normal heart sounds. No murmur heard.  Pulmonary:      Breath sounds: Decreased breath sounds present.     Musculoskeletal:         General: Normal range of motion.      Cervical back: Normal range of motion.      Right lower leg: No edema.      Left lower leg: No edema.     Skin:     General: Skin is warm and dry.     Neurological:      Mental Status: He is alert and oriented to person, place, and time.               [1]   Past Medical History:  Diagnosis Date    Tremor    [2] No family history on file.  [3] No past surgical history on file.  [4]   Current Outpatient Medications:     folic acid (FOLVITE) 1 mg tablet, Take 1 tablet (1 mg total) by mouth daily, Disp: 30 tablet, Rfl: 1    hydrOXYzine HCL (ATARAX) 25 mg tablet, Take 1 tablet (25 mg total) by mouth every 8 (eight) hours as needed for anxiety, Disp: 15 tablet, Rfl: 0    lisinopril (ZESTRIL) 2.5 mg tablet, Take 1 tablet (2.5 mg total) by mouth daily, Disp: 90 tablet, Rfl: 1    magnesium Oxide (MAG-OX) 400 mg TABS, Take 1 tablet (400 mg total) by mouth 2 (two) times a day, " Disp: 10 tablet, Rfl: 0    metoprolol succinate (TOPROL-XL) 25 mg 24 hr tablet, Take 1 tablet (25 mg total) by mouth daily, Disp: 90 tablet, Rfl: 1    naltrexone (REVIA) 50 mg tablet, Take 1 tablet (50 mg total) by mouth daily, Disp: 30 tablet, Rfl: 1    pantoprazole (PROTONIX) 40 mg tablet, Take 1 tablet (40 mg total) by mouth daily in the early morning, Disp: 30 tablet, Rfl: 0    spironolactone (ALDACTONE) 25 mg tablet, Take 0.5 tablets (12.5 mg total) by mouth daily, Disp: 45 tablet, Rfl: 1    thiamine 100 MG tablet, Take 1 tablet (100 mg total) by mouth daily, Disp: 30 tablet, Rfl: 1    nicotine (NICODERM CQ) 21 mg/24 hr TD 24 hr patch, Place 1 patch on the skin daily over 24 hours (Patient not taking: Reported on 7/2/2025), Disp: 28 patch, Rfl: 0

## 2025-07-02 NOTE — PROGRESS NOTES
Balaji Hamlin  1980  [id number]  [facility] [id number] [phone]    1. Alcoholic cardiomyopathy (HCC)  Ambulatory referral to Cardiology      2. Alcohol use disorder, severe, dependence (HCC)            Summary/Discussion:  Interval History: Balaji Hamlin is a 44 y.o. year old male with history of    who presents to the office today for routine follow up/ hospital follow up/for initial evaluation.     *** will RTO in *** with  *** or sooner if necessary. *** will call with any concerns.         Medical Problems       Problem List       Alcohol use disorder, severe, dependence (HCC)    Syncope    Alcoholic ketoacidosis    Dental infection    Alcohol withdrawal syndrome (HCC)    Cardiomyopathy, new diagnosis        Past Medical History[1]  Social History     Socioeconomic History   • Marital status: /Civil Union     Spouse name: Not on file   • Number of children: Not on file   • Years of education: Not on file   • Highest education level: Not on file   Occupational History   • Not on file   Tobacco Use   • Smoking status: Every Day     Current packs/day: 0.50     Types: Cigarettes   • Smokeless tobacco: Never   Vaping Use   • Vaping status: Never Used   Substance and Sexual Activity   • Alcohol use: Not Currently     Comment: daily   • Drug use: No   • Sexual activity: Not on file   Other Topics Concern   • Not on file   Social History Narrative   • Not on file     Social Drivers of Health     Financial Resource Strain: Not on file   Food Insecurity: No Food Insecurity (6/12/2025)    Nursing - Inadequate Food Risk Classification    • Worried About Running Out of Food in the Last Year: Not on file    • Ran Out of Food in the Last Year: Not on file    • Ran Out of Food in the Last Year: Never true   Transportation Needs: No Transportation Needs (6/12/2025)    Nursing - Transportation Risk Classification    • Lack of  "Transportation: Not on file    • Lack of Transportation: No   Physical Activity: Not on file   Stress: Not on file   Social Connections: Not on file   Intimate Partner Violence: Unknown (6/12/2025)    Nursing IPS    • Feels Physically and Emotionally Safe: Not on file    • Physically Hurt by Someone: Not on file    • Humiliated or Emotionally Abused by Someone: Not on file    • Physically Hurt by Someone: No    • Hurt or Threatened by Someone: No   Housing Stability: Unknown (6/12/2025)    Nursing: Inadequate Housing Risk Classification    • Has Housing: Not on file    • Worried About Losing Housing: Not on file    • Unable to Get Utilities: Not on file    • Unable to Pay for Housing in the Last Year: No    • Has Housing: No      Family History[2]  Past Surgical History[3]  Current Medications[4]  No Known Allergies    Labs:     Chemistry        Component Value Date/Time    K 3.8 06/15/2025 0454    CL 96 06/15/2025 0454    CO2 25 06/15/2025 0454    BUN 14 06/15/2025 0454    CREATININE 0.83 06/15/2025 0454        Component Value Date/Time    CALCIUM 9.3 06/15/2025 0454    ALKPHOS 52 06/15/2025 0454    AST 75 (H) 06/15/2025 0454    ALT 71 (H) 06/15/2025 0454            No results found for: \"CHOL\"  No results found for: \"HDL\"  No results found for: \"LDLCALC\"  No results found for: \"TRIG\"  No results found for: \"CHOLHDL\"    Imaging: Echo complete w/ contrast if indicated  Result Date: 6/13/2025  Narrative: •  Left Ventricle: Left ventricular cavity size is mildly dilated. Wall thickness is normal. The left ventricular ejection fraction is 40% by biplane measurement. Systolic function is moderately reduced. There is global hypokinesis. Unable to assess diastolic function due to E/A fusion. •  Right Ventricle: Right ventricular cavity size is normal. Systolic function is low normal. •  No prior study available for comparison.     VAS carotid complete study  Result Date: 6/12/2025  Narrative: [facility] . " CHA EL is a 44 year old M who was referred by TYLER GEE.    Indications: Patient presents with recent episode of syncope lasting several minutes.  He is currently asymptomatic. Operative History: No cardiovascular surgery Risk Factors: The patient reports smoking: current 0.5 packs per day.   Clinical: Right BP: IV placement, Left BP: 125/69 FINDINGS: Main                               Segment Impression  PSV  EDV  Ratio  Direction of Flow Right                                                                                  Prox CCA                                        64   16                             Mid CCA                                         82   17                             Dist CCA                                        76   17                             Prox. ICA                              Normal   50   17                             Mid. ICA                                        41   16                             Dist. ICA                                       61   23                             Ext Carotid                                     75   15                             Prox Vert.                                      28   10   1.40          Antegrade    Right Subclavian Artery                        143   13                          Left                                                                                   Prox CCA                                        90   16                             Mid CCA                                         77   19                             Dist CCA                                        76   18                             Prox. ICA                              Normal   46   21                             Mid. ICA                                        68   24                             Dist. ICA                                       46   20                             Ext Carotid                                     92   16                              Prox Vert                                       35   10   1.40          Antegrade    Left Subclavian Artery                         127   13                          CONCLUSION:  RIGHT: There is no evidence of disease throughout the extracranial carotid arteries. Vertebral artery flow is antegrade.  There is no significant subclavian artery disease.   LEFT: There is no evidence of disease throughout the extracranial carotid arteries. Vertebral artery flow is antegrade.  There is no significant subclavian artery disease.  SIGNATURE Signed by ANTHONY DUKE on 2025-06-12 10:44:00 [email or web address]    XR chest 1 view portable  Result Date: 6/11/2025  Narrative: XR CHEST PORTABLE INDICATION: Near syncope/syncope. Smoker. COMPARISON: 7/31/2024 FINDINGS: Clear lungs. No pneumothorax or pleural effusion. Normal cardiomediastinal silhouette. Bones are unremarkable for age. Normal upper abdomen.     Impression: No acute cardiopulmonary disease. Workstation performed: [id number]     CT head without contrast  Result Date: 6/11/2025  Narrative: CT BRAIN - WITHOUT CONTRAST INDICATION:   Intoxication, near syncope/syncope, headache, prior head injury. COMPARISON: CT head 7/31/2024. TECHNIQUE:  CT examination of the brain was performed.  Multiplanar 2D reformatted images were created from the source data. Radiation dose length product (DLP) for this visit:  861.7 mGy-cm. .  This examination, like all CT scans performed in the [facility], was performed utilizing techniques to minimize radiation dose exposure, including the use of iterative  reconstruction and automated exposure control. IMAGE QUALITY:  Diagnostic. FINDINGS: PARENCHYMA:No intracranial mass, mass effect or midline shift. No CT signs of acute infarction.  No acute parenchymal hemorrhage. VENTRICLES AND EXTRA-AXIAL SPACES:   "Normal for the patient's age. VISUALIZED ORBITS: Normal visualized orbits. PARANASAL SINUSES: Normal visualized paranasal sinuses. CALVARIUM AND EXTRACRANIAL SOFT TISSUES: Normal.     Impression: No acute intracranial abnormality. Workstation performed: [id number]       ECG:  ***    ROS    Vitals:    07/02/25 0909   BP: 100/70   Pulse: 80   Temp: 97.8 °F (36.6 °C)   SpO2: 97%     Vitals:    07/02/25 0909   Weight: 92.9 kg (204 lb 12.8 oz)     Height: 5' 10\" (177.8 cm)   Body mass index is 29.39 kg/m².    Physical Exam          [1]  Past Medical History:  Diagnosis Date   • Tremor    [2]  No family history on file.  [3]  No past surgical history on file.  [4]    Current Outpatient Medications:   •  folic acid (FOLVITE) 1 mg tablet, Take 1 tablet (1 mg total) by mouth daily, Disp: 30 tablet, Rfl: 0  •  hydrOXYzine HCL (ATARAX) 25 mg tablet, Take 1 tablet (25 mg total) by mouth every 8 (eight) hours as needed for anxiety, Disp: 15 tablet, Rfl: 0  •  lisinopril (ZESTRIL) 2.5 mg tablet, Take 1 tablet (2.5 mg total) by mouth daily, Disp: 30 tablet, Rfl: 0  •  magnesium Oxide (MAG-OX) 400 mg TABS, Take 1 tablet (400 mg total) by mouth 2 (two) times a day, Disp: 10 tablet, Rfl: 0  •  metoprolol tartrate (LOPRESSOR) 25 mg tablet, Take 0.5 tablets (12.5 mg total) by mouth every 12 (twelve) hours, Disp: 30 tablet, Rfl: 0  •  naltrexone (REVIA) 50 mg tablet, Take 1 tablet (50 mg total) by mouth daily, Disp: 30 tablet, Rfl: 0  •  pantoprazole (PROTONIX) 40 mg tablet, Take 1 tablet (40 mg total) by mouth daily in the early morning, Disp: 30 tablet, Rfl: 0  •  spironolactone (ALDACTONE) 25 mg tablet, Take 0.5 tablets (12.5 mg total) by mouth daily, Disp: 30 tablet, Rfl: 0  •  thiamine 100 MG tablet, Take 1 tablet (100 mg total) by mouth daily, Disp: 30 tablet, Rfl: 0  •  nicotine (NICODERM CQ) 21 mg/24 hr TD 24 hr patch, Place 1 patch on the skin daily over 24 hours (Patient not taking: Reported on 7/2/2025), Disp: 28 patch, Rfl: " 0